# Patient Record
Sex: FEMALE | Race: WHITE | NOT HISPANIC OR LATINO | Employment: FULL TIME | URBAN - METROPOLITAN AREA
[De-identification: names, ages, dates, MRNs, and addresses within clinical notes are randomized per-mention and may not be internally consistent; named-entity substitution may affect disease eponyms.]

---

## 2020-08-12 RX ORDER — ESTROGENS, CONJUGATED 1.25 MG
1 TABLET ORAL DAILY
COMMUNITY
Start: 2020-07-11 | End: 2020-08-12 | Stop reason: SDUPTHER

## 2020-08-12 RX ORDER — ESTROGENS, CONJUGATED 1.25 MG
1.25 TABLET ORAL DAILY
Qty: 90 TABLET | Refills: 3 | Status: SHIPPED | OUTPATIENT
Start: 2020-08-12 | End: 2021-08-09

## 2020-08-24 ENCOUNTER — TELEPHONE (OUTPATIENT)
Dept: OBSTETRICS AND GYNECOLOGY | Facility: CLINIC | Age: 50
End: 2020-08-24

## 2020-08-24 ENCOUNTER — OFFICE VISIT (OUTPATIENT)
Dept: OBSTETRICS AND GYNECOLOGY | Facility: CLINIC | Age: 50
End: 2020-08-24
Payer: COMMERCIAL

## 2020-08-24 VITALS
HEIGHT: 64 IN | DIASTOLIC BLOOD PRESSURE: 72 MMHG | SYSTOLIC BLOOD PRESSURE: 110 MMHG | BODY MASS INDEX: 29.19 KG/M2 | WEIGHT: 171 LBS

## 2020-08-24 DIAGNOSIS — Z01.419 GYNECOLOGIC EXAM NORMAL: Primary | ICD-10-CM

## 2020-08-24 DIAGNOSIS — B37.31 YEAST INFECTION OF THE VAGINA: ICD-10-CM

## 2020-08-24 DIAGNOSIS — Z12.31 VISIT FOR SCREENING MAMMOGRAM: ICD-10-CM

## 2020-08-24 PROCEDURE — 99396 PR PREVENTIVE VISIT,EST,40-64: ICD-10-PCS | Mod: S$GLB,,, | Performed by: NURSE PRACTITIONER

## 2020-08-24 PROCEDURE — 99396 PREV VISIT EST AGE 40-64: CPT | Mod: S$GLB,,, | Performed by: NURSE PRACTITIONER

## 2020-08-24 PROCEDURE — 3008F PR BODY MASS INDEX (BMI) DOCUMENTED: ICD-10-PCS | Mod: CPTII,S$GLB,, | Performed by: NURSE PRACTITIONER

## 2020-08-24 PROCEDURE — 3008F BODY MASS INDEX DOCD: CPT | Mod: CPTII,S$GLB,, | Performed by: NURSE PRACTITIONER

## 2020-08-24 RX ORDER — FLUCONAZOLE 150 MG/1
150 TABLET ORAL EVERY OTHER DAY
Qty: 2 TABLET | Refills: 0 | Status: SHIPPED | OUTPATIENT
Start: 2020-08-24 | End: 2020-08-27

## 2020-08-24 RX ORDER — DUPILUMAB 300 MG/2ML
INJECTION, SOLUTION SUBCUTANEOUS
COMMUNITY
Start: 2020-08-21 | End: 2021-08-25

## 2020-08-24 RX ORDER — ESCITALOPRAM OXALATE 10 MG/1
TABLET ORAL
COMMUNITY
Start: 2020-08-13 | End: 2023-09-21

## 2020-08-24 RX ORDER — LORAZEPAM 0.5 MG/1
TABLET ORAL
COMMUNITY
Start: 2020-08-14 | End: 2021-08-25

## 2020-08-24 RX ORDER — CLOTRIMAZOLE AND BETAMETHASONE DIPROPIONATE 10; .64 MG/G; MG/G
CREAM TOPICAL 2 TIMES DAILY
Qty: 30 G | Refills: 0 | Status: SHIPPED | OUTPATIENT
Start: 2020-08-24 | End: 2020-08-31

## 2020-08-24 RX ORDER — SPIRONOLACTONE 50 MG/1
TABLET, FILM COATED ORAL
COMMUNITY
Start: 2020-08-13 | End: 2022-03-29

## 2020-08-24 NOTE — PROGRESS NOTES
"  Subjective:      Patient ID: Jessie Bush is a 50 y.o. female who presents for evaluation today.    Chief Complaint:    Well Woman (No pap today.) and Vaginitis      History of Present Illness  HPI  Annual Exam-Postmenopausal  Patient presents for annual exam. The patient has no complaints today. The patient is sexually active. GYN screening history: last pap: was normal. The patient is taking hormone replacement therapy. Patient denies post-menopausal vaginal bleeding. The patient wears seatbelts: yes. The patient participates in regular exercise: no. Has the patient ever been transfused or tattooed?: not asked. The patient reports that there is not domestic violence in her life.         GYN History  No LMP recorded. Patient has had a hysterectomy.   Date of Last Pap: Pap smear completed today Pap smear not performed    VITALS  BP Readings from Last 1 Encounters:   08/24/20 110/72   Weight: 77.6 kg (171 lb)   Height: 5' 4" (162.6 cm)   BMI Readings from Last 1 Encounters:   08/24/20 29.35 kg/m²       FAMILY HISTORY  Family History   Problem Relation Age of Onset    Colon cancer Maternal Grandfather        SOCIAL HISTORY  Social History     Tobacco Use   Smoking Status Never Smoker   ,   Social History     Substance and Sexual Activity   Alcohol Use Never    Frequency: Never        MEDICATIONS  Outpatient Medications Marked as Taking for the 8/24/20 encounter (Office Visit) with Aydee Dougherty NP   Medication Sig Dispense Refill    DUPIXENT 300 mg/2 mL Syrg       escitalopram oxalate (LEXAPRO) 10 MG tablet       LORazepam (ATIVAN) 0.5 MG tablet TK 1/2 TO 1 T PO D PRF AGITATION/ANXIETY      PREMARIN 1.25 mg tablet Take 1 tablet (1.25 mg total) by mouth once daily. 90 tablet 3    spironolactone (ALDACTONE) 50 MG tablet TK 1 T PO QAM         Review of Systems   Review of Systems   Constitutional: Negative for activity change, appetite change, chills, fatigue and fever.   HENT: Negative for nasal " congestion and tinnitus.    Eyes: Negative for visual disturbance.   Respiratory: Negative for cough and shortness of breath.    Cardiovascular: Negative for chest pain and palpitations.   Gastrointestinal: Negative for abdominal pain, bloating, blood in stool, constipation, nausea and vomiting.   Endocrine: Negative for diabetes, hair loss and hot flashes.   Genitourinary: Negative for bladder incontinence, decreased libido, dysmenorrhea, dyspareunia, dysuria, flank pain, frequency, genital sores, hematuria, hot flashes, menorrhagia, menstrual problem, pelvic pain, urgency, vaginal bleeding, vaginal discharge, vaginal pain, urinary incontinence, postcoital bleeding, postmenopausal bleeding, vaginal dryness and vaginal odor.        Vaginal itching and irritation, had an old diflucan that she took and had a little relief. Taking probiotics and yogurt   Musculoskeletal: Negative for arthralgias, back pain, leg pain and myalgias.   Integumentary:  Negative for rash, acne, hair changes, mole/lesion, breast mass, nipple discharge, breast skin changes and breast tenderness.   Neurological: Negative for vertigo, syncope, numbness and headaches.   Hematological: Does not bruise/bleed easily.   Psychiatric/Behavioral: Negative for depression and sleep disturbance. The patient is not nervous/anxious.    Breast: Negative for asymmetry, lump, mass, mastodynia, nipple discharge, skin changes and tenderness          Objective:     Physical Exam:   Constitutional: She appears well-developed and well-nourished.    HENT:   Nose: No epistaxis.     Neck: No thyroid mass and no thyromegaly present.     Pulmonary/Chest: Effort normal and breath sounds normal. Chest wall is not dull to percussion. She exhibits no mass, no tenderness, no bony tenderness, no laceration, no crepitus, no edema, no deformity, no swelling and no retraction. Right breast exhibits no inverted nipple, no mass, no nipple discharge, no skin change, no tenderness,  presence, no bleeding and no swelling. Left breast exhibits no inverted nipple, no mass, no nipple discharge, no skin change, no tenderness, presence, no bleeding and no swelling. Breasts are symmetrical.        Abdominal: Soft. Normal appearance and bowel sounds are normal. She exhibits no distension. There is no abdominal tenderness. Hernia confirmed negative in the right inguinal area and confirmed negative in the left inguinal area.     Genitourinary:    Rectum normal.            Pelvic exam was performed with patient supine.   There is no rash, tenderness, lesion or injury on the right labia. There is no rash, tenderness, lesion or injury on the left labia. Uterus is absent. Right adnexum displays no mass, no tenderness and no fullness. Left adnexum displays no mass, no tenderness and no fullness. No erythema, tenderness, bleeding, rectocele, cystocele or unspecified prolapse of vaginal walls in the vagina.    No foreign body in the vagina.      No signs of injury in the vagina.   Vaginal cuff normal.Labial bartholins normal.Cervix exhibits absence.    Genitourinary Comments: White DC noted    positive for vaginal discharge               Skin: Skin is warm and dry.    Psychiatric: She has a normal mood and affect. Her speech is normal and behavior is normal.          Assessment:        1. Gynecologic exam normal    2. Yeast infection of the vagina    3. Visit for screening mammogram         Plan:     Patient instructed to contact the clinic should any questions or concerns arise prior to her next office visit. Patient is happy with the plan of care at this time, verbalizes understanding and denies outstanding questions.            PROBLEM VISIT NOTES  Subjective:      Patient ID: Jessie Bush is a 50 y.o. y.o. female who presents for her wellness exam today but would also like further evaluation of possible yeast infection.    Chief Complaint:    Well Woman (No pap today.) and Vaginitis      History of  "Present Illness:  Vaginal Discharge - Patient presents for evaluation of an abnormal vaginal discharge. Symptoms have been present for 5 days. Vaginal symptoms: local irritation. To date she reports OTC treatment modalities for symptom relief. The patient denies any recent exposure to STD's. Contraception: hyst. She denies blisters, bumps, lesions, odor and tears. Sexually transmitted infection risk: very low risk of STD exposure.     VITALS  BP Readings from Last 1 Encounters:   08/24/20 110/72   Weight: 77.6 kg (171 lb)   Height: 5' 4" (162.6 cm)   BMI Readings from Last 1 Encounters:   08/24/20 29.35 kg/m²     No LMP recorded. Patient has had a hysterectomy.    ROS: See above    Physical Exam: See above    Assessment:   1. Gynecologic exam normal     2. Yeast infection of the vagina  fluconazole (DIFLUCAN) 150 MG Tab    clotrimazole-betamethasone 1-0.05% (LOTRISONE) cream   3. Visit for screening mammogram  Mammo Digital Screening Bilat w/ [facility]         Plan:      Gynecologic exam normal    Yeast infection of the vagina  -     fluconazole (DIFLUCAN) 150 MG Tab; Take 1 tablet (150 mg total) by mouth every other day. for 2 doses  Dispense: 2 tablet; Refill: 0  -     clotrimazole-betamethasone 1-0.05% (LOTRISONE) cream; Apply topically 2 (two) times daily. for 7 days  Dispense: 30 g; Refill: 0    Visit for screening mammogram  -     Mammo Digital Screening Bilat w/ [facility]; Future; Expected date: 08/24/2020      Keep the vaginal area clean and dry. Warm moist environments are a good breeding ground for yeast. Shower promptly after exercising. Wear cotton underwear and loose fitting clothes on the bottom. If you swim, try not to stay in a wet bathing suit too long. Decreasing carbs and sugar in the diet can also prevent yeast infections. Please refer to the education handout or call the office with concerns            "

## 2020-08-24 NOTE — TELEPHONE ENCOUNTER
Called patient to schedule appointment for evaluation. Pt states she can come in this afternoon and wanted to reschedule her annual appointment for today vs next week. Pt scheduled. Claritza Herrera

## 2020-08-24 NOTE — TELEPHONE ENCOUNTER
----- Message from Frida Hurtado sent at 8/24/2020  9:40 AM CDT -----  .Type:  Needs Medical Advice    Who Called: self  Symptoms (please be specific): discharging, itching  How long has patient had these symptoms:  4 days  Pharmacy name and phone #:  .  [id number] - [place], [place] - [address] & [address] [address]  Phone: [phone] Fax: [phone]      Would the patient rather a call back or a response via [facility]? call  Best Call Back Number: .[phone] (home)   Additional Information:

## 2020-08-24 NOTE — PATIENT INSTRUCTIONS
Breast Health: Breast Self-Awareness  What is breast self-awareness?  Breast self-awareness is knowing how your breasts normally look and feel. Your breasts change as you go through different stages of your life. So its important to learn what is normal for your breasts. Breast self-awareness helps you notice any changes in your breasts right away. Report any changes to your healthcare provider.  Why is breast self-awareness important?  Many experts now say that women should focus on breast self-awareness instead of doing a breast self-examination (BSE). These experts include the American Cancer Society, the U.S. Preventive Services Task Force, and the American Congress of Obstetricians and Gynecologists. Some experts even advise not teaching women to do a BSE. Thats because research hasnt shown a clear benefit to doing BSEs.  Breast self-awareness is different than a BSE. Breast self-awareness isnt about following a certain method and schedule. Its about knowing what's normal for your breasts. That way you can notice even small changes right away. If you see any changes, report them to your healthcare provider.  Changes to look for  Call your healthcare provider if you find any changes in your breasts that concern you. These changes may include:  · A lump  · Nipple discharge other than breast milk, especially a bloody discharge  · Swelling  · A change in size or shape  · Skin irritation, such as redness, thickening, or dimpling of the skin  · Swollen lymph nodes in the armpit  · Nipple problems, such as pain or redness  If you find a lump  Contact your provider if you find lumpiness in one breast, feel something different in the tissue, or feel a definite lump. Sometimes lumpiness may be due to menstrual changes. But there may be reason for concern.  Your provider may want to see you right away if you have:  · Nipple discharge that is bloody  · Skin changes on your breast, such as dimpling or puckering  Its  normal to be upset if you find a lump. But its important to contact your provider right away. Remember that most breast lumps are benign. This means they are not cancer.  Date Last Reviewed: 8/10/2015  © 0585-7510 [employer]. [address] [place] [address]. All rights reserved. This information is not intended as a substitute for professional medical care. Always follow your healthcare professional's instructions.        Preventing Vaginitis     Use mild, unscented soap when you bathe or shower to avoid irritating your vagina.    Vaginitis is irritation or infection of the vagina or vulva (the outside opening of the vagina). Vaginitis can be caused by bacteria, viruses, parasites, or yeast. Chemicals (such as in perfumes or soaps or in spermicides) can sometimes be a cause. Vaginitis can be caused by hormone changes in pregnancy or with menopause. You can help prevent vaginitis. Follow the tips below. And see your healthcare provider if you have any symptoms.  Hygiene  · Avoid chemicals. Do not use vaginal sprays. Do not use scented toilet paper or tampons that are scented. Sprays and scents have chemicals that can irritate your vagina.  · Do not douche unless you are told to by your healthcare provider. Douching is rarely needed. And it upsets the normal balance in the vagina.  · Wash yourself well. Wash the outer vaginal area (vulva) every day with mild, unscented soap. Keep it as dry as possible.  · Wipe correctly. Make sure to wipe from front to back after a bowel movement. This helps keep from spreading bacteria from your anus to your vagina.  · Change your tampon often. During your period, make sure to change your tampon as often as directed on the package. This allows the normal flow of vaginal discharge and blood.  Lifestyle  · Limit your number of sexual partners. The more partners you have, the greater your risk of infection. Using condoms helps reduce your risk.  · Get enough  sleep. Sleep helps keep your bodys immune system healthy. This helps you fight infection.  · Lose weight, if needed. Excess weight can reduce air circulation around your vagina. This can increase your risk of infection.  · Exercise regularly. Regular activity helps keep your body healthy.  · Take antibiotics only as directed. Antibiotics can change the normal chemical balance in the vagina.    Clothing  · Dont sit in wet clothes. Yeast thrives when its warm and damp.  · Dont wear tight pants. And dont wear tights, leggings, or hose without a cotton crotch. These types of clothing trap warmth and moisture.  · Wear cotton underwear. Cotton lets air circulate around the vagina.  Symptoms of vaginitis  · Irritation, swelling, or itching of the genital area  · Vaginal discharge  · Bad vaginal odor  · Pain or burning during urination   Date Last Reviewed: 12/1/2016  © 1136-3371 [employer] [address] [place] [address], [place] [address]. All rights reserved. This information is not intended as a substitute for professional medical care. Always follow your healthcare professional's instructions.

## 2020-08-24 NOTE — TELEPHONE ENCOUNTER
----- Message from Frida Hurtado sent at 8/24/2020  9:40 AM CDT -----  .Type:  Needs Medical Advice    Who Called: self  Symptoms (please be specific): discharging, itching  How long has patient had these symptoms:  4 days  Pharmacy name and phone #:  .  [facility] #[id number] - [place], [place] - [address] [address]  Phone: [phone] Fax: [phone]      Would the patient rather a call back or a response via [facility]? call  Best Call Back Number: .[phone] (home)   Additional Information:

## 2021-08-25 ENCOUNTER — OFFICE VISIT (OUTPATIENT)
Dept: OBSTETRICS AND GYNECOLOGY | Facility: CLINIC | Age: 51
End: 2021-08-25
Payer: COMMERCIAL

## 2021-08-25 VITALS
SYSTOLIC BLOOD PRESSURE: 106 MMHG | HEIGHT: 64 IN | HEART RATE: 132 BPM | BODY MASS INDEX: 31.41 KG/M2 | WEIGHT: 184 LBS | DIASTOLIC BLOOD PRESSURE: 70 MMHG

## 2021-08-25 DIAGNOSIS — Z01.419 GYNECOLOGIC EXAM NORMAL: Primary | ICD-10-CM

## 2021-08-25 DIAGNOSIS — R68.82 DECREASED LIBIDO: ICD-10-CM

## 2021-08-25 DIAGNOSIS — Z13.820 SCREENING FOR OSTEOPOROSIS: ICD-10-CM

## 2021-08-25 DIAGNOSIS — R61 NIGHT SWEATS: ICD-10-CM

## 2021-08-25 DIAGNOSIS — Z12.11 COLON CANCER SCREENING: ICD-10-CM

## 2021-08-25 DIAGNOSIS — Z12.31 ENCOUNTER FOR SCREENING MAMMOGRAM FOR MALIGNANT NEOPLASM OF BREAST: ICD-10-CM

## 2021-08-25 DIAGNOSIS — R63.5 WEIGHT GAIN: ICD-10-CM

## 2021-08-25 PROCEDURE — 99396 PREV VISIT EST AGE 40-64: CPT | Mod: S$GLB,,, | Performed by: NURSE PRACTITIONER

## 2021-08-25 PROCEDURE — 3074F SYST BP LT 130 MM HG: CPT | Mod: CPTII,S$GLB,, | Performed by: NURSE PRACTITIONER

## 2021-08-25 PROCEDURE — 3074F PR MOST RECENT SYSTOLIC BLOOD PRESSURE < 130 MM HG: ICD-10-PCS | Mod: CPTII,S$GLB,, | Performed by: NURSE PRACTITIONER

## 2021-08-25 PROCEDURE — 1160F PR REVIEW ALL MEDS BY PRESCRIBER/CLIN PHARMACIST DOCUMENTED: ICD-10-PCS | Mod: CPTII,S$GLB,, | Performed by: NURSE PRACTITIONER

## 2021-08-25 PROCEDURE — 99396 PR PREVENTIVE VISIT,EST,40-64: ICD-10-PCS | Mod: S$GLB,,, | Performed by: NURSE PRACTITIONER

## 2021-08-25 PROCEDURE — 1159F MED LIST DOCD IN RCRD: CPT | Mod: CPTII,S$GLB,, | Performed by: NURSE PRACTITIONER

## 2021-08-25 PROCEDURE — 1159F PR MEDICATION LIST DOCUMENTED IN MEDICAL RECORD: ICD-10-PCS | Mod: CPTII,S$GLB,, | Performed by: NURSE PRACTITIONER

## 2021-08-25 PROCEDURE — 3008F BODY MASS INDEX DOCD: CPT | Mod: CPTII,S$GLB,, | Performed by: NURSE PRACTITIONER

## 2021-08-25 PROCEDURE — 3008F PR BODY MASS INDEX (BMI) DOCUMENTED: ICD-10-PCS | Mod: CPTII,S$GLB,, | Performed by: NURSE PRACTITIONER

## 2021-08-25 PROCEDURE — 1160F RVW MEDS BY RX/DR IN RCRD: CPT | Mod: CPTII,S$GLB,, | Performed by: NURSE PRACTITIONER

## 2021-08-25 PROCEDURE — 3078F PR MOST RECENT DIASTOLIC BLOOD PRESSURE < 80 MM HG: ICD-10-PCS | Mod: CPTII,S$GLB,, | Performed by: NURSE PRACTITIONER

## 2021-08-25 PROCEDURE — 3078F DIAST BP <80 MM HG: CPT | Mod: CPTII,S$GLB,, | Performed by: NURSE PRACTITIONER

## 2021-08-25 RX ORDER — TESTOSTERONE CYPIONATE 200 MG/ML
200 INJECTION, SOLUTION INTRAMUSCULAR
Qty: 1 ML | Refills: 5 | Status: SHIPPED | OUTPATIENT
Start: 2021-08-25 | End: 2022-03-07

## 2021-08-25 RX ORDER — BUDESONIDE AND FORMOTEROL FUMARATE DIHYDRATE 160; 4.5 UG/1; UG/1
2 AEROSOL RESPIRATORY (INHALATION) 2 TIMES DAILY
COMMUNITY
Start: 2021-08-10 | End: 2022-09-15

## 2021-08-25 RX ORDER — ESTRADIOL VALERATE 40 MG/ML
40 INJECTION INTRAMUSCULAR
Qty: 1 ML | Refills: 12 | Status: SHIPPED | OUTPATIENT
Start: 2021-08-25 | End: 2023-03-02 | Stop reason: SDUPTHER

## 2021-09-01 ENCOUNTER — TELEPHONE (OUTPATIENT)
Dept: OBSTETRICS AND GYNECOLOGY | Facility: CLINIC | Age: 51
End: 2021-09-01

## 2021-09-08 ENCOUNTER — PATIENT MESSAGE (OUTPATIENT)
Dept: OBSTETRICS AND GYNECOLOGY | Facility: CLINIC | Age: 51
End: 2021-09-08

## 2021-09-08 ENCOUNTER — TELEPHONE (OUTPATIENT)
Dept: OBSTETRICS AND GYNECOLOGY | Facility: CLINIC | Age: 51
End: 2021-09-08

## 2021-09-08 DIAGNOSIS — Z00.00 ENCOUNTER FOR WELLNESS EXAMINATION: Primary | ICD-10-CM

## 2021-09-21 ENCOUNTER — PATIENT MESSAGE (OUTPATIENT)
Dept: OBSTETRICS AND GYNECOLOGY | Facility: CLINIC | Age: 51
End: 2021-09-21

## 2021-09-30 ENCOUNTER — PATIENT MESSAGE (OUTPATIENT)
Dept: OBSTETRICS AND GYNECOLOGY | Facility: CLINIC | Age: 51
End: 2021-09-30

## 2021-09-30 ENCOUNTER — CLINICAL SUPPORT (OUTPATIENT)
Dept: OBSTETRICS AND GYNECOLOGY | Facility: CLINIC | Age: 51
End: 2021-09-30
Payer: COMMERCIAL

## 2021-09-30 DIAGNOSIS — Z78.0 MENOPAUSE: Primary | ICD-10-CM

## 2021-09-30 PROCEDURE — 96372 THER/PROPH/DIAG INJ SC/IM: CPT | Mod: S$GLB,,, | Performed by: NURSE PRACTITIONER

## 2021-09-30 PROCEDURE — 96372 PR INJECTION,THERAP/PROPH/DIAG2ST, IM OR SUBCUT: ICD-10-PCS | Mod: S$GLB,,, | Performed by: NURSE PRACTITIONER

## 2021-09-30 RX ORDER — ESTRADIOL VALERATE 20 MG/ML
40 INJECTION INTRAMUSCULAR
Status: COMPLETED | OUTPATIENT
Start: 2021-09-30 | End: 2021-09-30

## 2021-09-30 RX ADMIN — ESTRADIOL VALERATE 40 MG: 20 INJECTION INTRAMUSCULAR at 03:09

## 2021-10-15 ENCOUNTER — PATIENT MESSAGE (OUTPATIENT)
Dept: OBSTETRICS AND GYNECOLOGY | Facility: CLINIC | Age: 51
End: 2021-10-15
Payer: COMMERCIAL

## 2022-02-01 ENCOUNTER — TELEPHONE (OUTPATIENT)
Dept: PLASTIC SURGERY | Facility: CLINIC | Age: 52
End: 2022-02-01
Payer: COMMERCIAL

## 2022-02-01 NOTE — TELEPHONE ENCOUNTER
----- Message from Alex Alaniz sent at 2/1/2022  4:18 PM CST -----  Regarding: Sooner appt  Contact: [facility]  Appointment Request From: Jessie Bush    With Provider: Daljit    Preferred Date Range: 2/1/2022 - 2/28/2022    Preferred Times: Any Time    Reason for visit: Abdominoplasty    Comments:  Abdominoplasty        .Type:  Sooner Apoointment Request    Pt is requesting a sooner appointment.       When is the first available appointment?3/10/2022    Would the patient rather a call back or a response via [facility]? [facility] [place]

## 2022-02-25 ENCOUNTER — PATIENT MESSAGE (OUTPATIENT)
Dept: OBSTETRICS AND GYNECOLOGY | Facility: CLINIC | Age: 52
End: 2022-02-25
Payer: COMMERCIAL

## 2022-03-07 RX ORDER — TESTOSTERONE CYPIONATE 200 MG/ML
200 INJECTION, SOLUTION INTRAMUSCULAR
Qty: 1 ML | Refills: 1 | Status: SHIPPED | OUTPATIENT
Start: 2022-03-07 | End: 2022-07-25

## 2022-03-10 ENCOUNTER — OFFICE VISIT (OUTPATIENT)
Dept: PLASTIC SURGERY | Facility: CLINIC | Age: 52
End: 2022-03-10
Payer: COMMERCIAL

## 2022-03-10 ENCOUNTER — PATIENT MESSAGE (OUTPATIENT)
Dept: OBSTETRICS AND GYNECOLOGY | Facility: CLINIC | Age: 52
End: 2022-03-10
Payer: COMMERCIAL

## 2022-03-10 VITALS
BODY MASS INDEX: 35 KG/M2 | HEIGHT: 64 IN | WEIGHT: 205 LBS | DIASTOLIC BLOOD PRESSURE: 75 MMHG | SYSTOLIC BLOOD PRESSURE: 116 MMHG | HEART RATE: 89 BPM | OXYGEN SATURATION: 97 %

## 2022-03-10 DIAGNOSIS — E88.1 LIPODYSTROPHY: Primary | ICD-10-CM

## 2022-03-10 PROCEDURE — 1159F PR MEDICATION LIST DOCUMENTED IN MEDICAL RECORD: ICD-10-PCS | Mod: CPTII,S$GLB,, | Performed by: SURGERY

## 2022-03-10 PROCEDURE — 3074F SYST BP LT 130 MM HG: CPT | Mod: CPTII,S$GLB,, | Performed by: SURGERY

## 2022-03-10 PROCEDURE — 3008F PR BODY MASS INDEX (BMI) DOCUMENTED: ICD-10-PCS | Mod: CPTII,S$GLB,, | Performed by: SURGERY

## 2022-03-10 PROCEDURE — 3074F PR MOST RECENT SYSTOLIC BLOOD PRESSURE < 130 MM HG: ICD-10-PCS | Mod: CPTII,S$GLB,, | Performed by: SURGERY

## 2022-03-10 PROCEDURE — 3078F DIAST BP <80 MM HG: CPT | Mod: CPTII,S$GLB,, | Performed by: SURGERY

## 2022-03-10 PROCEDURE — 99499 UNLISTED E&M SERVICE: CPT | Mod: S$GLB,,, | Performed by: SURGERY

## 2022-03-10 PROCEDURE — 1159F MED LIST DOCD IN RCRD: CPT | Mod: CPTII,S$GLB,, | Performed by: SURGERY

## 2022-03-10 PROCEDURE — 3008F BODY MASS INDEX DOCD: CPT | Mod: CPTII,S$GLB,, | Performed by: SURGERY

## 2022-03-10 PROCEDURE — 3078F PR MOST RECENT DIASTOLIC BLOOD PRESSURE < 80 MM HG: ICD-10-PCS | Mod: CPTII,S$GLB,, | Performed by: SURGERY

## 2022-03-10 PROCEDURE — 99499 NO LOS: ICD-10-PCS | Mod: S$GLB,,, | Performed by: SURGERY

## 2022-03-10 NOTE — PROGRESS NOTES
CONSULTATION NOTE  ?  CC  Chief Complaint   Patient presents with    Surgical Consult     Cosmetic surgery     ?  Referring Provider  Self  ?  HPI  Jessie Bush is a 51 y.o. yo female who is here for evaluation for cosmetic surgery.     Weight has been stable 6 mo  Body mass index is Body mass index is 35.19 kg/m².    She is . No plans for future pregnancy.      Bra Size 40C, desires C cup desires to have them more symmetrical     Smoking history: never      No bleeding or clotting issues.     Prior surgery to abdomen or breast: bilateral breast reduction, laparoscopy, abdominal hysterectomy    No personal or family history of breast cancer in first degree relative.     Accutane use never  Occupation        PMH  Past Medical History:   Diagnosis Date    Asthma     Endometriosis     Ovarian cyst          PSH  Past Surgical History:   Procedure Laterality Date    DIAGNOSTIC LAPAROSCOPY      LAPAROSCOPIC SLEEVE GASTRECTOMY      LOOP ELECTROSURGICAL EXCISION PROCEDURE (LEEP)      REDUCTION OF BOTH BREASTS      TONSILLECTOMY      TOTAL ABDOMINAL HYSTERECTOMY W/ BILATERAL SALPINGOOPHORECTOMY           FHX  Family History   Problem Relation Age of Onset    No Known Problems Mother     No Known Problems Father     Colon cancer Maternal Grandfather     Breast cancer Neg Hx     Ovarian cancer Neg Hx     Uterine cancer Neg Hx     Melanoma Neg Hx     Pancreatic cancer Neg Hx     Prostate cancer Neg Hx           MEDICATIONS  Current Outpatient Medications   Medication Instructions    escitalopram oxalate (LEXAPRO) 10 MG tablet No dose, route, or frequency recorded.    estradiol valerate (DELESTROGEN) 40 mg, Intramuscular, Every 28 days    spironolactone (ALDACTONE) 50 MG tablet TK 1 T PO QAM    SYMBICORT 160-4.5 mcg/actuation HFAA 2 puffs, Inhalation, 2 times daily    testosterone cypionate (DEPOTESTOTERONE CYPIONATE) 200 mg, Intramuscular, Every 12 weeks         ALLERGIES  "  Review of patient's allergies indicates:  No Known Allergies      Social History  Social History     Socioeconomic History    Marital status:    Tobacco Use    Smoking status: Never Smoker    Smokeless tobacco: Never Used   Substance and Sexual Activity    Alcohol use: Never    Drug use: Never    Sexual activity: Yes     Partners: Male     Birth control/protection: See Surgical Hx     Comment: Hysterectomy           ROS  Review of Systems   Constitutional: Negative for chills and fever.   HENT: Negative for congestion.    Eyes: Negative for blurred vision and double vision.   Respiratory: Negative for cough and shortness of breath.    Cardiovascular: Negative for chest pain and palpitations.   Gastrointestinal: Negative for abdominal pain, nausea and vomiting.   Genitourinary: Negative for dysuria and frequency.   Musculoskeletal: Negative for back pain and neck pain.   Skin: Negative for itching and rash.   Neurological: Negative for dizziness, seizures and headaches.   Endo/Heme/Allergies: Does not bruise/bleed easily.   Psychiatric/Behavioral: Negative for depression and substance abuse.          Physical Exam  /75   Pulse 89   Ht 5' 4" (1.626 m)   Wt 93 kg (205 lb)   SpO2 97%   BMI 35.19 kg/m²     Constitutional:       General: She is not in acute distress.     Appearance: She is not diaphoretic.   HENT:      Head: Normocephalic and atraumatic.   Eyes:      General: No scleral icterus.     Conjunctiva/sclera: Conjunctivae normal.   Cardiovascular:      Rate and Rhythm: Normal rate.   Pulmonary:      Effort: Pulmonary effort is normal. No respiratory distress.      Breath sounds: No stridor.   Abdominal:      General: There is no distension.      Palpations: Abdomen is soft.      Tenderness: There is no abdominal tenderness.   Musculoskeletal:         General: No tenderness. Normal range of motion.      Cervical back: Normal range of motion.   Skin:     General: Skin is warm.      " Findings: No erythema or rash.   Neurological:      Mental Status: She is alert and oriented to person, place, and time.   Psychiatric:         Mood and Affect: Affect normal.         Judgment: Judgment normal.     Grade 2 ptosis nac widened, no mass no discharge bilateral  [place] scars well healed  Abdominal and lumbar lipodystrophy  Rectus diastasis  Skin pinch 3cm    No hernias appreciated  Mons pubis with some descent        IMPRESSION:  Abdominal lipodystrophy skin excess and diastasis      We discussed   1)  lipoabdominoplasty, exparel injection 4.5H   a)Then a second stage of liposuction of the upper abdomen/flanks(1H lipo fee) once recovered from first surgery given abd wall thickness T-1.5h    2) revision Mastopexy, exparel injection-3H     3).LipoAbdominoplasty, liposuction flank, lower back (supine), abdomen, chest wall, revision mastopexy-5.5H  -Then a second stage of liposuction of the upper abdomen/flanks(1Hlipofee) once recovered from first surgery T-1.5H    Need to find out what kind of breast reduction previously had with [facility] 20years ago    Obtain prior mammogram reports    She has a moderate amount of intra-abdominal fat which we discussed cannot be surgically modified by me and weight loss is the way to address this. We discussed postoperative expectations given these findings.

## 2022-03-11 PROBLEM — E88.1 LIPODYSTROPHY: Status: ACTIVE | Noted: 2022-03-11

## 2022-03-25 ENCOUNTER — PATIENT MESSAGE (OUTPATIENT)
Dept: OBSTETRICS AND GYNECOLOGY | Facility: CLINIC | Age: 52
End: 2022-03-25
Payer: COMMERCIAL

## 2022-03-29 ENCOUNTER — OFFICE VISIT (OUTPATIENT)
Dept: OBSTETRICS AND GYNECOLOGY | Facility: CLINIC | Age: 52
End: 2022-03-29
Payer: COMMERCIAL

## 2022-03-29 VITALS — WEIGHT: 205 LBS | BODY MASS INDEX: 35 KG/M2 | HEIGHT: 64 IN

## 2022-03-29 DIAGNOSIS — R35.0 FREQUENCY OF URINATION: Primary | ICD-10-CM

## 2022-03-29 DIAGNOSIS — Z98.890 STATUS POST COSMETIC PLASTIC SURGERY: Primary | ICD-10-CM

## 2022-03-29 DIAGNOSIS — M54.50 ACUTE BILATERAL LOW BACK PAIN WITHOUT SCIATICA: ICD-10-CM

## 2022-03-29 PROCEDURE — 3008F BODY MASS INDEX DOCD: CPT | Mod: CPTII,S$GLB,, | Performed by: NURSE PRACTITIONER

## 2022-03-29 PROCEDURE — 99214 PR OFFICE/OUTPT VISIT, EST, LEVL IV, 30-39 MIN: ICD-10-PCS | Mod: S$GLB,,, | Performed by: NURSE PRACTITIONER

## 2022-03-29 PROCEDURE — 1160F RVW MEDS BY RX/DR IN RCRD: CPT | Mod: CPTII,S$GLB,, | Performed by: NURSE PRACTITIONER

## 2022-03-29 PROCEDURE — 99214 OFFICE O/P EST MOD 30 MIN: CPT | Mod: S$GLB,,, | Performed by: NURSE PRACTITIONER

## 2022-03-29 PROCEDURE — 3008F PR BODY MASS INDEX (BMI) DOCUMENTED: ICD-10-PCS | Mod: CPTII,S$GLB,, | Performed by: NURSE PRACTITIONER

## 2022-03-29 PROCEDURE — 1160F PR REVIEW ALL MEDS BY PRESCRIBER/CLIN PHARMACIST DOCUMENTED: ICD-10-PCS | Mod: CPTII,S$GLB,, | Performed by: NURSE PRACTITIONER

## 2022-03-29 PROCEDURE — 1159F MED LIST DOCD IN RCRD: CPT | Mod: CPTII,S$GLB,, | Performed by: NURSE PRACTITIONER

## 2022-03-29 PROCEDURE — 1159F PR MEDICATION LIST DOCUMENTED IN MEDICAL RECORD: ICD-10-PCS | Mod: CPTII,S$GLB,, | Performed by: NURSE PRACTITIONER

## 2022-03-29 NOTE — PROGRESS NOTES
"  Subjective:       Patient ID: Jessie Bush is a 51 y.o. female.    Chief Complaint:  Urinary Tract Infection      History of Present Illness  HPI  urinary frequency x 2 days with lower back pain. Reports that she will void and then feel like she has to go again in 5 minutes. She reports that she had an URI last week and was on ABX and steroids and started wit ha yeast infection. She reports that those s/s have mostly resolved.     She has been experiencing some slight urinary leaking with sneezing    Outpatient Medications Marked as Taking for the 3/29/22 encounter (Office Visit) with Aydee Dougherty NP   Medication Sig Dispense Refill    benralizumab 30 mg/mL AtIn Inject 30 mg into the skin.      escitalopram oxalate (LEXAPRO) 10 MG tablet       estradiol valerate (DELESTROGEN) 40 mg/mL injection Inject 1 mL (40 mg total) into the muscle every 28 days. 1 mL 12    SYMBICORT 160-4.5 mcg/actuation HFAA Inhale 2 puffs into the lungs 2 (two) times daily.      testosterone cypionate (DEPOTESTOTERONE CYPIONATE) 200 mg/mL injection Inject 1 mL (200 mg total) into the muscle every 12 weeks. 1 mL 1     Vitals:    22 1334   Weight: 93 kg (205 lb)   Height: 5' 4" (1.626 m)     Past Medical History:   Diagnosis Date    Asthma     Endometriosis     Ovarian cyst      Past Surgical History:   Procedure Laterality Date    DIAGNOSTIC LAPAROSCOPY      LAPAROSCOPIC SLEEVE GASTRECTOMY      LOOP ELECTROSURGICAL EXCISION PROCEDURE (LEEP)      REDUCTION OF BOTH BREASTS      TONSILLECTOMY      TOTAL ABDOMINAL HYSTERECTOMY W/ BILATERAL SALPINGOOPHORECTOMY           GYN & OB History  No LMP recorded. Patient has had a hysterectomy.   Date of Last Pap: No result found    OB History    Para Term  AB Living   2 2 2     2   SAB IAB Ectopic Multiple Live Births                  # Outcome Date GA Lbr Onesimo/2nd Weight Sex Delivery Anes PTL Lv   2 Term      Vag-Spont      1 Term      Vag-Spont    "       Review of Systems  Review of Systems   Constitutional: Negative for activity change, appetite change, chills, fatigue and fever.   HENT: Negative for nasal congestion and tinnitus.    Eyes: Negative for visual disturbance.   Respiratory: Negative for cough and shortness of breath.    Cardiovascular: Negative for chest pain and palpitations.   Gastrointestinal: Negative for abdominal pain, bloating, blood in stool, constipation, nausea and vomiting.   Endocrine: Negative for diabetes, hair loss and hot flashes.   Genitourinary: Positive for frequency. Negative for bladder incontinence, decreased libido, dysmenorrhea, dyspareunia, dysuria, flank pain, genital sores, hematuria, hot flashes, menorrhagia, menstrual problem, pelvic pain, urgency, vaginal bleeding, vaginal discharge, vaginal pain, urinary incontinence, postcoital bleeding, postmenopausal bleeding, vaginal dryness and vaginal odor.   Musculoskeletal: Positive for back pain. Negative for arthralgias, leg pain and myalgias.   Integumentary:  Negative for rash, acne, hair changes, mole/lesion, breast mass, nipple discharge, breast skin changes and breast tenderness.   Neurological: Negative for vertigo, syncope, numbness and headaches.   Hematological: Does not bruise/bleed easily.   Psychiatric/Behavioral: Negative for depression and sleep disturbance. The patient is not nervous/anxious.    Breast: Negative for asymmetry, lump, mass, mastodynia, nipple discharge, skin changes and tenderness          Objective:    Physical Exam:   Constitutional: Vital signs are normal. She appears well-developed and well-nourished.    HENT:   Nose: No epistaxis.              Genitourinary:    Vagina normal.      Pelvic exam was performed with patient supine.   Labial bartholins normal.Right adnexum displays no mass, no tenderness and no fullness. Left adnexum displays no mass, no tenderness and no fullness. No erythema,  no vaginal discharge, tenderness, bleeding,  rectocele, cystocele or unspecified prolapse of vaginal walls in the vagina.    No foreign body in the vagina.      No signs of injury in the vagina.   Cervix is absent.Uterus is absent.                        Assessment:        1. Frequency of urination    2. Acute bilateral low back pain without sciatica                Plan:      Frequency of urination  -     Urine culture; Future; Expected date: 03/29/2022    Acute bilateral low back pain without sciatica  -     Urine culture; Future; Expected date: 03/29/2022      We will get cx and treat if needed. UA in office today was unremarkable.    Patient instructed to:    Increase oral fluid intake   Avoid consumption of bladder irritants such as, alcohol, carbonated beverages, etc.    Void immediately after intercourse if sexually active    Empty bladder at regular and frequent intervals    Patient education provided on the following topics:   Maintaining proper perineal hygiene   Taking showers versus baths   Avoiding heavily perfumed soaps and using a milder soap, such as Dove or Dial   Wearing loose non-restrictive clothing   Wearing cotton underwear    Instructed to call or return to clinic as needed if symptoms worsen or fail to improve as anticipated.     FU with PCP for concerns

## 2022-04-11 ENCOUNTER — TELEPHONE (OUTPATIENT)
Dept: PLASTIC SURGERY | Facility: CLINIC | Age: 52
End: 2022-04-11
Payer: COMMERCIAL

## 2022-04-11 NOTE — TELEPHONE ENCOUNTER
----- Message from Frida Hurtado sent at 4/11/2022  9:24 AM CDT -----  pt needs to know of other surgery dates, would like to make earlier. also hasn't heard anything regarding post operative garments. how soon and where can se pay remaining balance?...[phone] (home)

## 2022-04-11 NOTE — TELEPHONE ENCOUNTER
Spoke w/pt and let her know that her sx date of 5/31 is the soonest avail. Pt's preop appt was also r/s per pt request due to her being out of town on 4/18. Mary will contact pt regarding payment.

## 2022-05-11 DIAGNOSIS — Z41.1 ENCOUNTER FOR COSMETIC PROCEDURE: ICD-10-CM

## 2022-05-11 DIAGNOSIS — R63.4 WEIGHT LOSS: ICD-10-CM

## 2022-05-11 DIAGNOSIS — Z98.84 HISTORY OF WEIGHT LOSS SURGERY: ICD-10-CM

## 2022-05-11 DIAGNOSIS — R63.4 EXCESSIVE BODY WEIGHT LOSS: Primary | ICD-10-CM

## 2022-05-16 ENCOUNTER — OFFICE VISIT (OUTPATIENT)
Dept: PLASTIC SURGERY | Facility: CLINIC | Age: 52
End: 2022-05-16
Payer: COMMERCIAL

## 2022-05-16 VITALS
HEIGHT: 64 IN | DIASTOLIC BLOOD PRESSURE: 68 MMHG | HEART RATE: 83 BPM | SYSTOLIC BLOOD PRESSURE: 120 MMHG | OXYGEN SATURATION: 98 % | WEIGHT: 207 LBS | BODY MASS INDEX: 35.34 KG/M2

## 2022-05-16 DIAGNOSIS — Z41.1 ENCOUNTER FOR COSMETIC PROCEDURE: Primary | ICD-10-CM

## 2022-05-16 PROCEDURE — 3008F BODY MASS INDEX DOCD: CPT | Mod: CPTII,S$GLB,, | Performed by: SURGERY

## 2022-05-16 PROCEDURE — 3078F PR MOST RECENT DIASTOLIC BLOOD PRESSURE < 80 MM HG: ICD-10-PCS | Mod: CPTII,S$GLB,, | Performed by: SURGERY

## 2022-05-16 PROCEDURE — 3008F PR BODY MASS INDEX (BMI) DOCUMENTED: ICD-10-PCS | Mod: CPTII,S$GLB,, | Performed by: SURGERY

## 2022-05-16 PROCEDURE — 3074F SYST BP LT 130 MM HG: CPT | Mod: CPTII,S$GLB,, | Performed by: SURGERY

## 2022-05-16 PROCEDURE — 99499 UNLISTED E&M SERVICE: CPT | Mod: S$GLB,,, | Performed by: SURGERY

## 2022-05-16 PROCEDURE — 3074F PR MOST RECENT SYSTOLIC BLOOD PRESSURE < 130 MM HG: ICD-10-PCS | Mod: CPTII,S$GLB,, | Performed by: SURGERY

## 2022-05-16 PROCEDURE — 99499 NO LOS: ICD-10-PCS | Mod: S$GLB,,, | Performed by: SURGERY

## 2022-05-16 PROCEDURE — 3078F DIAST BP <80 MM HG: CPT | Mod: CPTII,S$GLB,, | Performed by: SURGERY

## 2022-05-16 RX ORDER — CEPHALEXIN 500 MG/1
500 CAPSULE ORAL EVERY 6 HOURS
Qty: 28 CAPSULE | Refills: 0 | Status: SHIPPED | OUTPATIENT
Start: 2022-05-16 | End: 2022-05-23

## 2022-05-16 RX ORDER — CYCLOBENZAPRINE HCL 10 MG
10 TABLET ORAL 3 TIMES DAILY
Qty: 15 TABLET | Refills: 0 | Status: SHIPPED | OUTPATIENT
Start: 2022-05-16 | End: 2022-05-21

## 2022-05-16 RX ORDER — OXYCODONE AND ACETAMINOPHEN 5; 325 MG/1; MG/1
TABLET ORAL
Qty: 20 TABLET | Refills: 0 | Status: SHIPPED | OUTPATIENT
Start: 2022-05-16 | End: 2022-08-11

## 2022-05-16 RX ORDER — ONDANSETRON 4 MG/1
4 TABLET, FILM COATED ORAL EVERY 6 HOURS PRN
Qty: 20 TABLET | Refills: 0 | Status: SHIPPED | OUTPATIENT
Start: 2022-05-16 | End: 2022-05-21

## 2022-05-16 RX ORDER — KETOROLAC TROMETHAMINE 10 MG/1
10 TABLET, FILM COATED ORAL EVERY 6 HOURS
Qty: 20 TABLET | Refills: 0 | Status: SHIPPED | OUTPATIENT
Start: 2022-05-16 | End: 2022-05-16 | Stop reason: CLARIF

## 2022-05-16 NOTE — PROGRESS NOTES
?  CC  Chief Complaint   Patient presents with    Surgical Consult     Cosmetic surgery     ?  Referring Provider  Self  ?  HPI  Jessie Bush is a 52 y.o. yo female who is here for evaluation for cosmetic surgery. Pt interested in revision breast reduction and lipoabdominoplasty    Weight has been stable 6 mo  Body mass index is Body mass index is 35.53 kg/m².    She is . No plans for future pregnancy.      Bra Size 40C, desires C cup desires to have them more symmetrical     Smoking history: never      No bleeding or clotting issues.     Prior surgery to abdomen or breast: bilateral breast reduction, laparoscopy, abdominal hysterectomy    No personal or family history of breast cancer in first degree relative.     Accutane use never  Occupation    Preoperative exam for bilateral revision breast reduction and lipoabdominoplasty scheduled for 22. Estradiol injection at the end of April will hold next dose prior to surgery. She did receive her SCDs at home. Consents done, pre and post operative instructions given to patient.     PMH  Past Medical History:   Diagnosis Date    Asthma     Endometriosis     Ovarian cyst          PSH  Past Surgical History:   Procedure Laterality Date    DIAGNOSTIC LAPAROSCOPY      LAPAROSCOPIC SLEEVE GASTRECTOMY      LOOP ELECTROSURGICAL EXCISION PROCEDURE (LEEP)      REDUCTION OF BOTH BREASTS      TONSILLECTOMY      TOTAL ABDOMINAL HYSTERECTOMY W/ BILATERAL SALPINGOOPHORECTOMY           FHX  Family History   Problem Relation Age of Onset    No Known Problems Mother     No Known Problems Father     Colon cancer Maternal Grandfather     Breast cancer Neg Hx     Ovarian cancer Neg Hx     Uterine cancer Neg Hx     Melanoma Neg Hx     Pancreatic cancer Neg Hx     Prostate cancer Neg Hx           MEDICATIONS  Current Outpatient Medications   Medication Instructions    benralizumab 30 mg, Subcutaneous, Every 2 months, used for asthma  "   cephALEXin (KEFLEX) 500 mg, Oral, Every 6 hours    cyclobenzaprine (FLEXERIL) 10 mg, Oral, 3 times daily    escitalopram oxalate (LEXAPRO) 10 MG tablet No dose, route, or frequency recorded.    estradiol valerate (DELESTROGEN) 40 mg, Intramuscular, Every 28 days    ondansetron (ZOFRAN) 4 mg, Oral, Every 6 hours PRN    oxyCODONE-acetaminophen (PERCOCET) 5-325 mg per tablet Take 1-2 tablets PO [id number] PRN pain    SYMBICORT 160-4.5 mcg/actuation HFAA 2 puffs, Inhalation, 2 times daily    testosterone cypionate (DEPOTESTOTERONE CYPIONATE) 200 mg, Intramuscular, Every 12 weeks         ALLERGIES   Review of patient's allergies indicates:  No Known Allergies      Social History  Social History     Socioeconomic History    Marital status:    Tobacco Use    Smoking status: Never Smoker    Smokeless tobacco: Never Used   Substance and Sexual Activity    Alcohol use: Never    Drug use: Never    Sexual activity: Yes     Partners: Male     Birth control/protection: See Surgical Hx     Comment: Hysterectomy           ROS  Review of Systems   Constitutional: Negative for chills and fever.   HENT: Negative for congestion.    Eyes: Negative for blurred vision and double vision.   Respiratory: Negative for cough and shortness of breath.    Cardiovascular: Negative for chest pain and palpitations.   Gastrointestinal: Negative for abdominal pain, nausea and vomiting.   Genitourinary: Negative for dysuria and frequency.   Musculoskeletal: Negative for back pain and neck pain.   Skin: Negative for itching and rash.   Neurological: Negative for dizziness, seizures and headaches.   Endo/Heme/Allergies: Does not bruise/bleed easily.   Psychiatric/Behavioral: Negative for depression and substance abuse.          Physical Exam  /68   Pulse 83   Ht 5' 4" (1.626 m)   Wt 93.9 kg (207 lb)   SpO2 98%   BMI 35.53 kg/m²     Constitutional:       General: She is not in acute distress.     Appearance: She is not " diaphoretic.   HENT:      Head: Normocephalic and atraumatic.   Eyes:      General: No scleral icterus.     Conjunctiva/sclera: Conjunctivae normal.   Cardiovascular:      Rate and Rhythm: Normal rate.   Pulmonary:      Effort: Pulmonary effort is normal. No respiratory distress.      Breath sounds: No stridor.   Abdominal:      General: There is no distension.      Palpations: Abdomen is soft.      Tenderness: There is no abdominal tenderness.   Musculoskeletal:         General: No tenderness. Normal range of motion.      Cervical back: Normal range of motion.   Skin:     General: Skin is warm.      Findings: No erythema or rash.   Neurological:      Mental Status: She is alert and oriented to person, place, and time.   Psychiatric:         Mood and Affect: Affect normal.         Judgment: Judgment normal.     Grade 2 ptosis nac widened, no mass no discharge bilateral  [place] scars well healed  Abdominal and lumbar lipodystrophy  Rectus diastasis  Skin pinch 3cm    No hernias appreciated  Mons pubis with some descent        IMPRESSION:  Abdominal lipodystrophy skin excess and diastasis      PLAN:  1).LipoAbdominoplasty, liposuction flank, lower back (supine), abdomen, chest wall, revision mastopexy-5.5H  First assist requested.   Will send note to primary to optimize iron and H&H levels prior to surgery.   prealb 18- protein supplements BID    Revision bbr central pedicle  Sarah de ken abdominoplasty  Melecio Hernández take toradol  [facility] [id number] benign    INFORMED CONSENT  The proposed procedure, any treatment options, expected and possible outcomes including complications, any necessary perioperative medicinal and activity restrictions has, expected recovery and potential disability were fully reviewed with the patient. Permission to obtain photographs before, during, and after surgery was also granted. An opportunity to ask questions was given, and written informed consent was given to proceed. This Informed Consent  discussion took place prior to the signing the consent form.         Risks of the procedure:  incomplete removal of skin   recurrence  incomplete correction, failure to correct problem, worsening of problem  poor cosmetic outcome  need for further surgery  bleeding  infection  thick/poor scarring  wound separation, failure to heal  disability  pain  Numbness  Nerve injury  problems with anesthesia  blood clot, deep venous thrombosis, pulmonary embolus  heart attack  stroke  death     The patient demonstrated understanding of risks and consent signed with RN witness.

## 2022-05-27 LAB
25(OH)D3 SERPL-MCNC: 24 NG/ML
BASOPHILS NFR BLD: 0.3 % (ref 0–3)
EOSINOPHIL NFR BLD: 0 % (ref 1–3)
ERYTHROCYTE [DISTWIDTH] IN BLOOD BY AUTOMATED COUNT: 16.5 % (ref 12.5–18)
FERRITIN SERPL-MCNC: 8 NG/ML (ref 8–388)
HCT VFR BLD AUTO: 34.9 % (ref 37–47)
HGB BLD-MCNC: 10.7 G/DL (ref 12–16)
HYPOCHROMIA BLD QL SMEAR: NORMAL
LYMPHOCYTES NFR BLD: 33.5 % (ref 25–40)
MCH RBC QN AUTO: 26.8 PG (ref 27–31.2)
MCHC RBC AUTO-ENTMCNC: 30.7 G/DL (ref 31.8–35.4)
MCV RBC AUTO: 87.5 FL (ref 80–97)
MONOCYTES NFR BLD: 10 % (ref 1–15)
NEUTROPHILS # BLD AUTO: 3.83 10*3/UL (ref 1.8–7.7)
NEUTROPHILS NFR BLD: 55.8 % (ref 37–80)
NUCLEATED RED BLOOD CELLS: 0 %
PLATELETS: 420 10*3/UL (ref 142–424)
RBC # BLD AUTO: 3.99 10*6/UL (ref 4.2–5.4)
WBC # BLD: 6.9 10*3/UL (ref 4.6–10.2)

## 2022-05-31 ENCOUNTER — OUTSIDE PLACE OF SERVICE (OUTPATIENT)
Dept: PLASTIC SURGERY | Facility: CLINIC | Age: 52
End: 2022-05-31

## 2022-05-31 PROCEDURE — 15877 PR SUCT ASSIS LIPECTOMY,TRUNK: ICD-10-PCS | Mod: CSM,,, | Performed by: SURGERY

## 2022-05-31 PROCEDURE — 15830 PR EXCISE EXCESS SKIN TISSUE,ABDOMEN: ICD-10-PCS | Mod: CSM,,, | Performed by: SURGERY

## 2022-05-31 PROCEDURE — 15877 SUCTION LIPECTOMY TRUNK: CPT | Mod: CSM,,, | Performed by: SURGERY

## 2022-05-31 PROCEDURE — 15830 EXC EXCESSIVE SKIN ABDOMEN: CPT | Mod: CSM,,, | Performed by: SURGERY

## 2022-05-31 PROCEDURE — 19318 PR REDUCTION OF LARGE BREAST: ICD-10-PCS | Mod: CSM,50,, | Performed by: SURGERY

## 2022-05-31 PROCEDURE — 19318 BREAST REDUCTION: CPT | Mod: CSM,50,, | Performed by: SURGERY

## 2022-06-01 LAB — SPECIMEN TO PATHOLOGY: NORMAL

## 2022-06-03 ENCOUNTER — CLINICAL SUPPORT (OUTPATIENT)
Dept: PLASTIC SURGERY | Facility: CLINIC | Age: 52
End: 2022-06-03
Payer: COMMERCIAL

## 2022-06-03 VITALS
SYSTOLIC BLOOD PRESSURE: 121 MMHG | HEIGHT: 64 IN | WEIGHT: 205 LBS | DIASTOLIC BLOOD PRESSURE: 68 MMHG | HEART RATE: 104 BPM | BODY MASS INDEX: 35 KG/M2

## 2022-06-03 DIAGNOSIS — Z98.890 STATUS POST COSMETIC PLASTIC SURGERY: Primary | ICD-10-CM

## 2022-06-03 NOTE — PROGRESS NOTES
"        POST-OPERATIVE EXAM    CC  Chief Complaint   Patient presents with    Wound Check     Nurse visit to check drains and incisions       PROCEDURE  Bilateral breast lift with lipoabdominoplasty 5/31/22    SUBJECTIVE  Moderate bruising and swelling to bilateral breast. Binder was wrapped up to mid breast. Swelling to mons noted as well.  Drains with out put around 180-210cc total on each side. Incisions intake. There is a small area of thin skin noted to left breast left of the areola. Instructed pt to watch this area for any breakdown or further discoloration.  Abdominal binder repositioned on pt and instructed pt to keep the top of the binder under her breast away from the incisions. ABD pads placed over all incisions.  Bra in place. Ace wrap lightly placed around breast to help with the extra swelling          PHYSICAL EXAM  /68   Pulse 104   Ht 5' 4" (1.626 m)   Wt 93 kg (205 lb)   BMI 35.19 kg/m²     " mild

## 2022-06-06 ENCOUNTER — OFFICE VISIT (OUTPATIENT)
Dept: PLASTIC SURGERY | Facility: CLINIC | Age: 52
End: 2022-06-06
Payer: COMMERCIAL

## 2022-06-06 VITALS
DIASTOLIC BLOOD PRESSURE: 69 MMHG | SYSTOLIC BLOOD PRESSURE: 103 MMHG | HEART RATE: 100 BPM | OXYGEN SATURATION: 100 % | RESPIRATION RATE: 18 BRPM | BODY MASS INDEX: 35 KG/M2 | HEIGHT: 64 IN | TEMPERATURE: 97 F | WEIGHT: 205 LBS

## 2022-06-06 DIAGNOSIS — Z98.890 STATUS POST COSMETIC PLASTIC SURGERY: Primary | ICD-10-CM

## 2022-06-06 PROCEDURE — 3008F BODY MASS INDEX DOCD: CPT | Mod: CPTII,S$GLB,, | Performed by: SURGERY

## 2022-06-06 PROCEDURE — 3078F PR MOST RECENT DIASTOLIC BLOOD PRESSURE < 80 MM HG: ICD-10-PCS | Mod: CPTII,S$GLB,, | Performed by: SURGERY

## 2022-06-06 PROCEDURE — 99024 PR POST-OP FOLLOW-UP VISIT: ICD-10-PCS | Mod: S$GLB,,, | Performed by: SURGERY

## 2022-06-06 PROCEDURE — 99024 POSTOP FOLLOW-UP VISIT: CPT | Mod: S$GLB,,, | Performed by: SURGERY

## 2022-06-06 PROCEDURE — 3078F DIAST BP <80 MM HG: CPT | Mod: CPTII,S$GLB,, | Performed by: SURGERY

## 2022-06-06 PROCEDURE — 1159F MED LIST DOCD IN RCRD: CPT | Mod: CPTII,S$GLB,, | Performed by: SURGERY

## 2022-06-06 PROCEDURE — 3074F SYST BP LT 130 MM HG: CPT | Mod: CPTII,S$GLB,, | Performed by: SURGERY

## 2022-06-06 PROCEDURE — 3008F PR BODY MASS INDEX (BMI) DOCUMENTED: ICD-10-PCS | Mod: CPTII,S$GLB,, | Performed by: SURGERY

## 2022-06-06 PROCEDURE — 1159F PR MEDICATION LIST DOCUMENTED IN MEDICAL RECORD: ICD-10-PCS | Mod: CPTII,S$GLB,, | Performed by: SURGERY

## 2022-06-06 PROCEDURE — 3074F PR MOST RECENT SYSTOLIC BLOOD PRESSURE < 130 MM HG: ICD-10-PCS | Mod: CPTII,S$GLB,, | Performed by: SURGERY

## 2022-06-06 RX ORDER — FUROSEMIDE 40 MG/1
40 TABLET ORAL DAILY
Qty: 5 TABLET | Refills: 0 | Status: SHIPPED | OUTPATIENT
Start: 2022-06-06 | End: 2022-09-15

## 2022-06-06 RX ORDER — POTASSIUM CHLORIDE 20 MEQ/1
20 TABLET, EXTENDED RELEASE ORAL DAILY
Qty: 5 TABLET | Refills: 0 | Status: SHIPPED | OUTPATIENT
Start: 2022-06-06 | End: 2022-06-11

## 2022-06-06 NOTE — PROGRESS NOTES
"POST-OPERATIVE EXAM    CC  none    PROCEDURE  Revision BBR, Lipoabdominoplasty 531    SUBJECTIVE  No fevers or pain uncontrolled.    Denies fevers, drainage, or wound concerns.     PHYSICAL EXAM  /69   Pulse 100   Temp 97.3 °F (36.3 °C)   Resp 18   Ht 5' 4" (1.626 m)   Wt 93 kg (205 lb)   SpO2 100%   BMI 35.19 kg/m²   Breast symmetry and shape good, soft no fat necrosis  Minor bruising   Healing primarily  Scars pink, without hypertrophy  No masses  Nipples sensate  NAC healthy, viable    Surgical site  Incisions clean dry and intact  No seroma or hematoma        PATHOLOGY  6/1/22 [facility]   1. RIGHT BREAST TISSUE,  REDUCTION MAMMOPLASTY:       - BENIGN MAMMARY TISSUE  and  SKIN, WEIGHT 330 GRAMS.   2. LEFT BREAST TISSUE,  REDUCTION MAMMOPLASTY:       - BENIGN MAMMARY TISSUE  and  SKIN, WEIGHT 315 GRAMS.     ASSESSMENT  No signs of complications.  Patient is doing well after surgery.       PLAN  Care/instructions were reviewed, written handout given (see AVS).  Continue sport bra/surgical bra  No upper body exercise/heavy lifting x 1 month  Ok to shower  Lasix K, keep hydrated  Walking around the block  F/u in 1 weeks        WOUND CARE INSTRUCTIONS  BATHING  You may shower normally. No soaking tub bath or swimming pool until cleared by Dr. Bocanegra.       MEDICATIONS  Continue your usual medications and vitamins    SCAR MANAGEMENT  Scars may take over 1 year to mature.  Some scars will remain pink, dark purple, and possibly raised for 6-9 months after surgery.  After one year, scars often become flatter, smoother, and may change color.  After removal of the tape, suture removal, or when glue was used, apply a thin layer of Aquaphor (available at any drugstore) or antibiotic ointment to the scars for another 2 weeks.  Begin silicone when scars smooth, generally starting about 2 weeks after surgery.  Medical grade silicone gel is available on companies' web sites or on amazon.com  Brands recommended:  - " Biocorneum  - Skin medica Scar Recovery Gel Skin Medica  Massage the scar twice daily for about 30 seconds

## 2022-06-10 ENCOUNTER — CLINICAL SUPPORT (OUTPATIENT)
Dept: PLASTIC SURGERY | Facility: CLINIC | Age: 52
End: 2022-06-10
Payer: COMMERCIAL

## 2022-06-10 DIAGNOSIS — Z98.890 POST-OPERATIVE STATE: Primary | ICD-10-CM

## 2022-06-10 NOTE — PROGRESS NOTES
Pt arrived but did not need help getting back into her garment.  She does report having severe anxiety while wearing it for long periods of time but I encouraged her to wear as long and as much as she can since this will help with her overall results.  Her remaining drain had an output of 60cc in 24 hour period. We should be able to remove it on Monday. Foam given to pt to help with mid abdomen bulging.  No other complaints she is otherwise feeling well and able to stand pretty straight. Instructed to call with any questions or concerns

## 2022-06-12 ENCOUNTER — PATIENT MESSAGE (OUTPATIENT)
Dept: PLASTIC SURGERY | Facility: CLINIC | Age: 52
End: 2022-06-12
Payer: COMMERCIAL

## 2022-06-13 ENCOUNTER — OFFICE VISIT (OUTPATIENT)
Dept: PLASTIC SURGERY | Facility: CLINIC | Age: 52
End: 2022-06-13
Payer: COMMERCIAL

## 2022-06-13 VITALS
DIASTOLIC BLOOD PRESSURE: 64 MMHG | SYSTOLIC BLOOD PRESSURE: 97 MMHG | OXYGEN SATURATION: 98 % | BODY MASS INDEX: 35 KG/M2 | WEIGHT: 205 LBS | HEART RATE: 79 BPM | HEIGHT: 64 IN

## 2022-06-13 DIAGNOSIS — Z98.890 STATUS POST COSMETIC PLASTIC SURGERY: ICD-10-CM

## 2022-06-13 DIAGNOSIS — Z98.890 POST-OPERATIVE STATE: Primary | ICD-10-CM

## 2022-06-13 PROCEDURE — 99024 PR POST-OP FOLLOW-UP VISIT: ICD-10-PCS | Mod: S$GLB,,, | Performed by: SURGERY

## 2022-06-13 PROCEDURE — 3008F BODY MASS INDEX DOCD: CPT | Mod: CPTII,S$GLB,, | Performed by: SURGERY

## 2022-06-13 PROCEDURE — 99024 POSTOP FOLLOW-UP VISIT: CPT | Mod: S$GLB,,, | Performed by: SURGERY

## 2022-06-13 PROCEDURE — 3074F SYST BP LT 130 MM HG: CPT | Mod: CPTII,S$GLB,, | Performed by: SURGERY

## 2022-06-13 PROCEDURE — 3078F PR MOST RECENT DIASTOLIC BLOOD PRESSURE < 80 MM HG: ICD-10-PCS | Mod: CPTII,S$GLB,, | Performed by: SURGERY

## 2022-06-13 PROCEDURE — 3074F PR MOST RECENT SYSTOLIC BLOOD PRESSURE < 130 MM HG: ICD-10-PCS | Mod: CPTII,S$GLB,, | Performed by: SURGERY

## 2022-06-13 PROCEDURE — 3008F PR BODY MASS INDEX (BMI) DOCUMENTED: ICD-10-PCS | Mod: CPTII,S$GLB,, | Performed by: SURGERY

## 2022-06-13 PROCEDURE — 1159F MED LIST DOCD IN RCRD: CPT | Mod: CPTII,S$GLB,, | Performed by: SURGERY

## 2022-06-13 PROCEDURE — 3078F DIAST BP <80 MM HG: CPT | Mod: CPTII,S$GLB,, | Performed by: SURGERY

## 2022-06-13 PROCEDURE — 1159F PR MEDICATION LIST DOCUMENTED IN MEDICAL RECORD: ICD-10-PCS | Mod: CPTII,S$GLB,, | Performed by: SURGERY

## 2022-06-13 NOTE — PROGRESS NOTES
"POST-OPERATIVE EXAM    CC  Chief Complaint   Patient presents with    Post-op Evaluation       PROCEDURE  Revision BBR, Lipoabdominoplasty 531    Pt has been wearing binder and garment as much as possible but not full time. Pts PCP did prescribe Klonopin and is feeling better and not having panic attacks anymore and able to sleep at night.     SUBJECTIVE     Denies fevers, drainage, or wound concerns.     PHYSICAL EXAM  BP 97/64   Pulse 79   Ht 5' 4" (1.626 m)   Wt 93 kg (205 lb)   SpO2 98%   BMI 35.19 kg/m²     Breast symmetry and shape good, soft no fat necrosis  Minor bruising   Healing primarily  Scars pink, without hypertrophy  No masses  Nipples sensate  NAC healthy, viable  Left nipple with some eschar inferior lateral periwound erythema  Right T with some eschar     No seroma or hematoma  Lateral flanks with some edema and contour irregularity       ASSESSMENT  Partial nipple necrosis 20%    PLAN  Local wound care- wash with VASHE and cover nipple and T with baci and adaptec, daily change  No abx at this time  rtc Monday  Needs to start lymphatic massage and wear garments 24h  "

## 2022-06-20 ENCOUNTER — OFFICE VISIT (OUTPATIENT)
Dept: PLASTIC SURGERY | Facility: CLINIC | Age: 52
End: 2022-06-20
Payer: COMMERCIAL

## 2022-06-20 VITALS
HEIGHT: 64 IN | HEART RATE: 108 BPM | WEIGHT: 205 LBS | DIASTOLIC BLOOD PRESSURE: 76 MMHG | SYSTOLIC BLOOD PRESSURE: 125 MMHG | BODY MASS INDEX: 35 KG/M2 | OXYGEN SATURATION: 98 %

## 2022-06-20 DIAGNOSIS — Z98.890: Primary | ICD-10-CM

## 2022-06-20 PROCEDURE — 3078F DIAST BP <80 MM HG: CPT | Mod: CPTII,S$GLB,, | Performed by: SURGERY

## 2022-06-20 PROCEDURE — 3008F PR BODY MASS INDEX (BMI) DOCUMENTED: ICD-10-PCS | Mod: CPTII,S$GLB,, | Performed by: SURGERY

## 2022-06-20 PROCEDURE — 99024 PR POST-OP FOLLOW-UP VISIT: ICD-10-PCS | Mod: S$GLB,,, | Performed by: SURGERY

## 2022-06-20 PROCEDURE — 1159F MED LIST DOCD IN RCRD: CPT | Mod: CPTII,S$GLB,, | Performed by: SURGERY

## 2022-06-20 PROCEDURE — 1159F PR MEDICATION LIST DOCUMENTED IN MEDICAL RECORD: ICD-10-PCS | Mod: CPTII,S$GLB,, | Performed by: SURGERY

## 2022-06-20 PROCEDURE — 3008F BODY MASS INDEX DOCD: CPT | Mod: CPTII,S$GLB,, | Performed by: SURGERY

## 2022-06-20 PROCEDURE — 99024 POSTOP FOLLOW-UP VISIT: CPT | Mod: S$GLB,,, | Performed by: SURGERY

## 2022-06-20 PROCEDURE — 3078F PR MOST RECENT DIASTOLIC BLOOD PRESSURE < 80 MM HG: ICD-10-PCS | Mod: CPTII,S$GLB,, | Performed by: SURGERY

## 2022-06-20 PROCEDURE — 3074F PR MOST RECENT SYSTOLIC BLOOD PRESSURE < 130 MM HG: ICD-10-PCS | Mod: CPTII,S$GLB,, | Performed by: SURGERY

## 2022-06-20 PROCEDURE — 3074F SYST BP LT 130 MM HG: CPT | Mod: CPTII,S$GLB,, | Performed by: SURGERY

## 2022-06-20 NOTE — PROGRESS NOTES
"POST-OPERATIVE EXAM    CC  Chief Complaint   Patient presents with    Post-op Evaluation       PROCEDURE  Revision BBR, Lipoabdominoplasty 531      SUBJECTIVE    Denies fevers, drainage  Left nipple wound, applying baci and adaptic    PHYSICAL EXAM  /76   Pulse 108   Ht 5' 4" (1.626 m)   Wt 93 kg (205 lb)   SpO2 98%   BMI 35.19 kg/m²     Breast symmetry and shape good, soft no fat necrosis  Scars pink, without hypertrophy  No masses  Nipples sensate  NAC healthy, viable  Left nipple with inferior ischemia and necrosis fibrin exudate no erythema or drainage  3 x 4 cm  Right 1 x 1 cm area of fibrinous exudate inferior to nipple        ASSESSMENT  Patient is doing well after surgery.   Allow time for contour and scar maturation.    PLAN  Santyl mixed with baci applied to breast breakdown areas. Adaptic and ABD pad applied. Pt to follow up in 1 week.  Begin lymphatic massage      WOUND CARE INSTRUCTIONS  BATHING  You may shower normally. No soaking tub bath or swimming pool until cleared by Dr. Bocanegra.    WOUND CARE  Santyl baci daily      SUTURES  Sutures do not require further removal    ACTIVITY  You may resume moderate exercise (walking, incline walking, stationary bike)   You may progress to full exercise after 4 weeks.  Avoid heavy lifting, running, swimming, strenuous activity for 4 weeks.  Avoid travel out of town for 4 weeks.    MEDICATIONS  Take pain medication as needed:  Tylenol (acetominophen) 500 mg every 6 hours for mild pain.  Motrin (Ibuprofen) 600 mg every 8 hours for mild pain.  DO NOT exceed 4 grams of Tylenol in a 24 h period  Continue your usual medications and vitamins    SCAR MANAGEMENT  Scars may take over 1 year to mature.  Some scars will remain pink, dark purple, and possibly raised for 6-9 months after surgery.  After one year, scars often become flatter, smoother, and may change color.  After removal of the tape, suture removal, or when glue was used, apply a thin layer of " Aquaphor (available at any drugstore) or antibiotic ointment to the scars for another 2 weeks.  Begin silicone when scars smooth, generally starting about 2 weeks after surgery.  Medical grade silicone gel is available on companies' web sites or on amazon.com  Brands recommended:  - Biocorneum  - Skin medica Scar Recovery Gel Skin Medica  Massage the scar twice daily for about 30 seconds

## 2022-06-21 ENCOUNTER — PATIENT MESSAGE (OUTPATIENT)
Dept: PLASTIC SURGERY | Facility: CLINIC | Age: 52
End: 2022-06-21
Payer: COMMERCIAL

## 2022-06-27 ENCOUNTER — TELEPHONE (OUTPATIENT)
Dept: PLASTIC SURGERY | Facility: CLINIC | Age: 52
End: 2022-06-27

## 2022-06-27 NOTE — TELEPHONE ENCOUNTER
----- Message from Criss Jackson sent at 6/27/2022  2:10 PM CDT -----  Contact: PT  .Type:  Sooner Appointment Request    Caller is requesting a sooner appointment.  Caller declined first available appointment listed below.  Caller will not accept being placed on the waitlist and is requesting a message be sent to doctor.  Name of Caller: Jessie  When is the first available appointment? 07/25/2022  Symptoms: r/s post op appt   Would the patient rather a call back or a response via [facility]?  Callback   Best Call Back Number: .[phone] (home)        Additional Information:

## 2022-07-05 ENCOUNTER — OFFICE VISIT (OUTPATIENT)
Dept: PLASTIC SURGERY | Facility: CLINIC | Age: 52
End: 2022-07-05
Payer: COMMERCIAL

## 2022-07-05 VITALS — BODY MASS INDEX: 33.63 KG/M2 | HEIGHT: 64 IN | WEIGHT: 197 LBS

## 2022-07-05 DIAGNOSIS — Z98.890: Primary | ICD-10-CM

## 2022-07-05 PROCEDURE — 3008F BODY MASS INDEX DOCD: CPT | Mod: CPTII,S$GLB,, | Performed by: SURGERY

## 2022-07-05 PROCEDURE — 3008F PR BODY MASS INDEX (BMI) DOCUMENTED: ICD-10-PCS | Mod: CPTII,S$GLB,, | Performed by: SURGERY

## 2022-07-05 PROCEDURE — 99024 PR POST-OP FOLLOW-UP VISIT: ICD-10-PCS | Mod: S$GLB,,, | Performed by: SURGERY

## 2022-07-05 PROCEDURE — 99024 POSTOP FOLLOW-UP VISIT: CPT | Mod: S$GLB,,, | Performed by: SURGERY

## 2022-07-05 NOTE — PROGRESS NOTES
"POST-OPERATIVE EXAM    CC  Chief Complaint   Patient presents with    Post-op Evaluation       PROCEDURE  Revision BBR, Lipoabdominoplasty 531      SUBJECTIVE  Preoperative symptoms have improved.  Pt c/o soreness at both T spots.  Denies fevers, drainage, or wound concerns.     PHYSICAL EXAM  Ht 5' 4" (1.626 m)   Wt 89.4 kg (197 lb)   BMI 33.81 kg/m²    areas of right breast subareolar and left breast subareolar and T are healign better with minimal fibrin  abd wall with lipodystrophy remaining and lower fullness, feels more like muscle laxity       ASSESSMENT   No signs of complications.  Patient is doing well after surgery.   Allow time for contour and scar maturation.    PLAN     Pt has been using the Bacitracin and the Santyl. Pt will continue using the Bacitracin until her Santyl comes in, along with the adaptic. Pt is being referred to [facility] with no restrictions. Pt will follow up in 2 weeks.      WOUND CARE INSTRUCTIONS  BATHING  You may shower normally. No soaking tub bath or swimming pool until cleared by Dr. Bocanegra.    WOUND CARE  See above    SUTURES  Sutures do not require further removal    ACTIVITY  FULL    MEDICATIONS  Continue your usual medications and vitamins    SCAR MANAGEMENT  Scars may take over 1 year to mature.  Some scars will remain pink, dark purple, and possibly raised for 6-9 months after surgery.  After one year, scars often become flatter, smoother, and may change color.  After removal of the tape, suture removal, or when glue was used, apply a thin layer of Aquaphor (available at any drugstore) or antibiotic ointment to the scars for another 2 weeks.  Begin silicone when scars smooth, generally starting about 2 weeks after surgery.  Medical grade silicone gel is available on companies' web sites or on amazon.com  Brands recommended:  - Biocorneum  - Skin medica Scar Recovery Gel Skin Medica  Massage the scar twice daily for about 30 seconds      "

## 2022-07-13 ENCOUNTER — TELEPHONE (OUTPATIENT)
Dept: PLASTIC SURGERY | Facility: CLINIC | Age: 52
End: 2022-07-13
Payer: COMMERCIAL

## 2022-07-13 NOTE — TELEPHONE ENCOUNTER
----- Message from Malaika Ta sent at 7/13/2022 11:21 AM CDT -----  Contact: Patient  Patient need the nurse to call her      Call back # [phone]

## 2022-07-18 ENCOUNTER — OFFICE VISIT (OUTPATIENT)
Dept: PLASTIC SURGERY | Facility: CLINIC | Age: 52
End: 2022-07-18
Payer: COMMERCIAL

## 2022-07-18 VITALS
SYSTOLIC BLOOD PRESSURE: 102 MMHG | DIASTOLIC BLOOD PRESSURE: 55 MMHG | HEIGHT: 64 IN | BODY MASS INDEX: 33.26 KG/M2 | HEART RATE: 88 BPM | OXYGEN SATURATION: 99 % | WEIGHT: 194.81 LBS

## 2022-07-18 DIAGNOSIS — Z98.890 STATUS POST COSMETIC PLASTIC SURGERY: Primary | ICD-10-CM

## 2022-07-18 PROCEDURE — 3078F DIAST BP <80 MM HG: CPT | Mod: CPTII,S$GLB,, | Performed by: SURGERY

## 2022-07-18 PROCEDURE — 3074F PR MOST RECENT SYSTOLIC BLOOD PRESSURE < 130 MM HG: ICD-10-PCS | Mod: CPTII,S$GLB,, | Performed by: SURGERY

## 2022-07-18 PROCEDURE — 3078F PR MOST RECENT DIASTOLIC BLOOD PRESSURE < 80 MM HG: ICD-10-PCS | Mod: CPTII,S$GLB,, | Performed by: SURGERY

## 2022-07-18 PROCEDURE — 1159F MED LIST DOCD IN RCRD: CPT | Mod: CPTII,S$GLB,, | Performed by: SURGERY

## 2022-07-18 PROCEDURE — 3074F SYST BP LT 130 MM HG: CPT | Mod: CPTII,S$GLB,, | Performed by: SURGERY

## 2022-07-18 PROCEDURE — 99024 POSTOP FOLLOW-UP VISIT: CPT | Mod: S$GLB,,, | Performed by: SURGERY

## 2022-07-18 PROCEDURE — 1159F PR MEDICATION LIST DOCUMENTED IN MEDICAL RECORD: ICD-10-PCS | Mod: CPTII,S$GLB,, | Performed by: SURGERY

## 2022-07-18 PROCEDURE — 3008F PR BODY MASS INDEX (BMI) DOCUMENTED: ICD-10-PCS | Mod: CPTII,S$GLB,, | Performed by: SURGERY

## 2022-07-18 PROCEDURE — 3008F BODY MASS INDEX DOCD: CPT | Mod: CPTII,S$GLB,, | Performed by: SURGERY

## 2022-07-18 PROCEDURE — 99024 PR POST-OP FOLLOW-UP VISIT: ICD-10-PCS | Mod: S$GLB,,, | Performed by: SURGERY

## 2022-07-18 NOTE — PROGRESS NOTES
"POST-OPERATIVE EXAM    CC  Chief Complaint   Patient presents with    Post-op Evaluation       PROCEDURE  Revision BBR, Lipoabdominoplasty 531      SUBJECTIVE     Bilateral breast wounds are getting smaller. Pt c/o right abdominal pain above her incision.     PHYSICAL EXAM  BP (!) 102/55   Pulse 88   Ht 5' 4" (1.626 m)   Wt 88.4 kg (194 lb 12.8 oz)   SpO2 99%   BMI 33.44 kg/m²   Breast symmetry and shape good, soft no fat necrosis  Wounds along vertical in both breast healign,s table    ASSESSMENT   No signs of complications.  Patient is doing well after surgery.   Allow time for contour and scar maturation.    PLAN   Pt instructed to do scar massage. Pt also instructed to go back to [facility] for scar massage. Pt will continue with Santyl and now start with the woven gauze instead of the adaptic. Pt will follow up at 3 month geeta.    "

## 2022-08-11 ENCOUNTER — PATIENT MESSAGE (OUTPATIENT)
Dept: OBSTETRICS AND GYNECOLOGY | Facility: CLINIC | Age: 52
End: 2022-08-11
Payer: COMMERCIAL

## 2022-08-11 DIAGNOSIS — Z78.0 MENOPAUSE: Primary | ICD-10-CM

## 2022-08-11 RX ORDER — TESTOSTERONE CYPIONATE 200 MG/ML
200 INJECTION, SOLUTION INTRAMUSCULAR
Qty: 1 ML | Refills: 0 | Status: SHIPPED | OUTPATIENT
Start: 2022-08-11 | End: 2022-08-12 | Stop reason: SDUPTHER

## 2022-08-17 RX ORDER — ESTRADIOL 0.1 MG/G
1 CREAM VAGINAL
Qty: 42.5 G | Refills: 1 | Status: SHIPPED | OUTPATIENT
Start: 2022-08-18 | End: 2023-09-21

## 2022-08-17 RX ORDER — TESTOSTERONE CYPIONATE 200 MG/ML
200 INJECTION, SOLUTION INTRAMUSCULAR
Qty: 1 ML | Refills: 0 | Status: SHIPPED | OUTPATIENT
Start: 2022-08-17 | End: 2022-10-27 | Stop reason: SDUPTHER

## 2022-09-08 ENCOUNTER — OFFICE VISIT (OUTPATIENT)
Dept: PLASTIC SURGERY | Facility: CLINIC | Age: 52
End: 2022-09-08
Payer: COMMERCIAL

## 2022-09-08 VITALS
HEART RATE: 88 BPM | WEIGHT: 192 LBS | OXYGEN SATURATION: 97 % | BODY MASS INDEX: 32.78 KG/M2 | HEIGHT: 64 IN | SYSTOLIC BLOOD PRESSURE: 127 MMHG | DIASTOLIC BLOOD PRESSURE: 81 MMHG

## 2022-09-08 DIAGNOSIS — Z98.890 STATUS POST COSMETIC PLASTIC SURGERY: Primary | ICD-10-CM

## 2022-09-08 PROCEDURE — 3074F PR MOST RECENT SYSTOLIC BLOOD PRESSURE < 130 MM HG: ICD-10-PCS | Mod: CPTII,S$GLB,, | Performed by: SURGERY

## 2022-09-08 PROCEDURE — 99024 PR POST-OP FOLLOW-UP VISIT: ICD-10-PCS | Mod: S$GLB,,, | Performed by: SURGERY

## 2022-09-08 PROCEDURE — 99024 POSTOP FOLLOW-UP VISIT: CPT | Mod: S$GLB,,, | Performed by: SURGERY

## 2022-09-08 PROCEDURE — 3079F DIAST BP 80-89 MM HG: CPT | Mod: CPTII,S$GLB,, | Performed by: SURGERY

## 2022-09-08 PROCEDURE — 3008F BODY MASS INDEX DOCD: CPT | Mod: CPTII,S$GLB,, | Performed by: SURGERY

## 2022-09-08 PROCEDURE — 3008F PR BODY MASS INDEX (BMI) DOCUMENTED: ICD-10-PCS | Mod: CPTII,S$GLB,, | Performed by: SURGERY

## 2022-09-08 PROCEDURE — 3079F PR MOST RECENT DIASTOLIC BLOOD PRESSURE 80-89 MM HG: ICD-10-PCS | Mod: CPTII,S$GLB,, | Performed by: SURGERY

## 2022-09-08 PROCEDURE — 3074F SYST BP LT 130 MM HG: CPT | Mod: CPTII,S$GLB,, | Performed by: SURGERY

## 2022-09-08 NOTE — PROGRESS NOTES
CC  Chief Complaint   Patient presents with    Follow-up     3.5 month post op visit      Referring Provider- self  PCP: Clem Pierce MD    HPI  History from patient, chart, referring provider  Jessie Bush is a 52 y.o. female presenting 3 months after surgery.     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       PSH  Past Surgical History:   Procedure Laterality Date    AUGMENTATION OF BREAST  05/2022    COSMETIC SURGERY Bilateral 05/31/2022    bilateral breast lift with abdominoplasty    DIAGNOSTIC LAPAROSCOPY      LAPAROSCOPIC SLEEVE GASTRECTOMY      LIPOSUCTION  05/2022    LOOP ELECTROSURGICAL EXCISION PROCEDURE (LEEP)      REDUCTION OF BOTH BREASTS  2000    SINUS SURGERY  09/01/2022    TONSILLECTOMY      TOTAL ABDOMINAL HYSTERECTOMY W/ BILATERAL SALPINGOOPHORECTOMY  2000         Family History   Problem Relation Age of Onset    No Known Problems Mother     No Known Problems Father     Colon cancer Maternal Grandfather     Breast cancer Neg Hx     Ovarian cancer Neg Hx     Uterine cancer Neg Hx     Melanoma Neg Hx     Pancreatic cancer Neg Hx     Prostate cancer Neg Hx        MEDICATIONS  Outpatient Medications Marked as Taking for the 9/8/22 encounter (Office Visit) with Ashleigh Bocanegra MD   Medication Sig Dispense Refill    benralizumab 30 mg/mL AtIn Inject 30 mg into the skin. Every 2 months, used for asthma      clonazepam (KLONOPIN ORAL) Take by mouth.      escitalopram oxalate (LEXAPRO) 10 MG tablet       estradioL (ESTRACE) 0.01 % (0.1 mg/gram) vaginal cream Place 1 g vaginally twice a week. 42.5 g 1    testosterone cypionate (DEPOTESTOTERONE CYPIONATE) 200 mg/mL injection Inject 1 mL (200 mg total) into the muscle  "every 12 weeks. Can given injection every 8-12 weeks 1 mL 0    [DISCONTINUED] SYMBICORT 160-4.5 mcg/actuation HFAA Inhale 2 puffs into the lungs 2 (two) times daily.         ALLERGIES Review of patient's allergies indicates:  No Known Allergies    SOCIAL HISTORY  Tobacco:   Social History     Tobacco Use   Smoking Status Never   Smokeless Tobacco Never     EtOH:   Social History     Substance and Sexual Activity   Alcohol Use Never         REVIEW OF SYSTEMS  Constitutional: Negative for chills and fever.   HENT: Negative for congestion.    Eyes: Negative for blurred vision and double vision.   Respiratory: Negative for cough and shortness of breath.    Cardiovascular: Negative for chest pain and palpitations.   Gastrointestinal: Negative for abdominal pain, nausea and vomiting.   Genitourinary: Negative for dysuria and frequency.   Musculoskeletal: Negative for back pain and neck pain.   Skin: Negative for itching and rash.   Neurological: Negative for dizziness, seizures and headaches.   Endo/Heme/Allergies: Does not bruise/bleed easily.   Psychiatric/Behavioral: Negative for depression and substance abuse.       PHYSICAL EXAM  /81   Pulse 88   Ht 5' 4" (1.626 m)   Wt 87.1 kg (192 lb)   SpO2 97%   BMI 32.96 kg/m²     Constitutional: She is oriented to person, place, and time. She appears well-developed and well-nourished.   HENT: Normocephalic and atraumatic.   Neck: Normal range of motion. Neck supple. No JVD present.   Cardiovascular: Normal rate, regular rhythm and normal heart sounds.    Pulmonary/Chest: Effort normal. No respiratory distress.   Musculoskeletal: Normal range of motion. She exhibits no edema or deformity.   Neurological: She is alert and oriented to person, place, and time. No sensory deficit. She exhibits normal muscle tone.   Skin: Skin is warm. No rash noted. No erythema.   Psychiatric: She has a normal mood and affect. Her behavior is normal.      Breast symmetry and shape good, " soft no fat necrosis  Healing primarily  Scars pink, without hypertrophy  No masses  Nipples sensate  NAC healthy, viable      ASSESSMENT  Encounter Diagnoses   Name Primary?    Status post cosmetic plastic surgery Yes       ?   PLAN   Bacitracin or Aquaphor and bandaide over wound twice daily  Consider closing in office if unable to get complete closure after trying the ointment.   Return in 4 weeks    ACTIVITY  No restrictions    MEDICATIONS  Continue your usual medications and vitamins per PCP.    SCAR MANAGEMENT  Scars may take over 1 year to mature.  Some scars will remain pink, dark purple, and possibly raised for 6-9 months after surgery.  After one year, scars often become flatter, smoother, and may change color.  Continue sun protection, scar gel and massage for this entire duration to optimize scar appearance.     Medical grade silicone gel is available on companies' web sites or on amazon.com  Brands recommended:  - Biocorneum  - Skin medica Scar Recovery Gel Skin Medica

## 2022-09-15 ENCOUNTER — OFFICE VISIT (OUTPATIENT)
Dept: OBSTETRICS AND GYNECOLOGY | Facility: CLINIC | Age: 52
End: 2022-09-15
Payer: COMMERCIAL

## 2022-09-15 VITALS
SYSTOLIC BLOOD PRESSURE: 106 MMHG | WEIGHT: 196 LBS | HEART RATE: 99 BPM | DIASTOLIC BLOOD PRESSURE: 71 MMHG | BODY MASS INDEX: 33.64 KG/M2

## 2022-09-15 DIAGNOSIS — N95.1 MENOPAUSE SYNDROME: ICD-10-CM

## 2022-09-15 DIAGNOSIS — Z01.419 GYNECOLOGIC EXAM NORMAL: Primary | ICD-10-CM

## 2022-09-15 DIAGNOSIS — Z12.31 ENCOUNTER FOR SCREENING MAMMOGRAM FOR MALIGNANT NEOPLASM OF BREAST: ICD-10-CM

## 2022-09-15 DIAGNOSIS — Z13.820 SCREENING FOR OSTEOPOROSIS: ICD-10-CM

## 2022-09-15 PROCEDURE — 3074F SYST BP LT 130 MM HG: CPT | Mod: CPTII,S$GLB,, | Performed by: NURSE PRACTITIONER

## 2022-09-15 PROCEDURE — 1159F MED LIST DOCD IN RCRD: CPT | Mod: CPTII,S$GLB,, | Performed by: NURSE PRACTITIONER

## 2022-09-15 PROCEDURE — 99396 PR PREVENTIVE VISIT,EST,40-64: ICD-10-PCS | Mod: S$GLB,,, | Performed by: NURSE PRACTITIONER

## 2022-09-15 PROCEDURE — 99396 PREV VISIT EST AGE 40-64: CPT | Mod: S$GLB,,, | Performed by: NURSE PRACTITIONER

## 2022-09-15 PROCEDURE — 3078F DIAST BP <80 MM HG: CPT | Mod: CPTII,S$GLB,, | Performed by: NURSE PRACTITIONER

## 2022-09-15 PROCEDURE — 1160F RVW MEDS BY RX/DR IN RCRD: CPT | Mod: CPTII,S$GLB,, | Performed by: NURSE PRACTITIONER

## 2022-09-15 PROCEDURE — 1160F PR REVIEW ALL MEDS BY PRESCRIBER/CLIN PHARMACIST DOCUMENTED: ICD-10-PCS | Mod: CPTII,S$GLB,, | Performed by: NURSE PRACTITIONER

## 2022-09-15 PROCEDURE — 3078F PR MOST RECENT DIASTOLIC BLOOD PRESSURE < 80 MM HG: ICD-10-PCS | Mod: CPTII,S$GLB,, | Performed by: NURSE PRACTITIONER

## 2022-09-15 PROCEDURE — 1159F PR MEDICATION LIST DOCUMENTED IN MEDICAL RECORD: ICD-10-PCS | Mod: CPTII,S$GLB,, | Performed by: NURSE PRACTITIONER

## 2022-09-15 PROCEDURE — 3008F PR BODY MASS INDEX (BMI) DOCUMENTED: ICD-10-PCS | Mod: CPTII,S$GLB,, | Performed by: NURSE PRACTITIONER

## 2022-09-15 PROCEDURE — 3074F PR MOST RECENT SYSTOLIC BLOOD PRESSURE < 130 MM HG: ICD-10-PCS | Mod: CPTII,S$GLB,, | Performed by: NURSE PRACTITIONER

## 2022-09-15 PROCEDURE — 3008F BODY MASS INDEX DOCD: CPT | Mod: CPTII,S$GLB,, | Performed by: NURSE PRACTITIONER

## 2022-09-15 RX ORDER — AZITHROMYCIN 250 MG/1
TABLET, FILM COATED ORAL
COMMUNITY
Start: 2022-09-13 | End: 2022-12-12

## 2022-09-15 RX ORDER — FERROUS SULFATE 324(65)MG
TABLET, DELAYED RELEASE (ENTERIC COATED) ORAL 2 TIMES DAILY
COMMUNITY
Start: 2022-08-19

## 2022-09-15 RX ORDER — SPIRONOLACTONE 50 MG/1
50 TABLET, FILM COATED ORAL EVERY MORNING
COMMUNITY
Start: 2022-07-27

## 2022-09-15 RX ORDER — DUPILUMAB 300 MG/2ML
INJECTION, SOLUTION SUBCUTANEOUS
COMMUNITY
Start: 2022-09-12

## 2022-09-15 RX ORDER — COLLAGENASE SANTYL 250 [ARB'U]/G
OINTMENT TOPICAL
COMMUNITY
Start: 2022-07-06 | End: 2023-09-21

## 2022-09-15 NOTE — PROGRESS NOTES
Subjective:       Patient ID: Jessie Bush is a 52 y.o. female.    Chief Complaint:  Well Woman      History of Present Illness  HPI  Annual Exam-Postmenopausal  Patient presents for annual exam. The patient has no complaints today. The patient is sexually active. GYN screening history: last pap: was abnormal: ascus  and last mammogram: was normal. The patient is taking hormone replacement therapy. Patient denies post-menopausal vaginal bleeding. The patient wears seatbelts: yes. She is interesting in other HRT options     Outpatient Medications Marked as Taking for the 9/15/22 encounter (Office Visit) with Aydee Dougherty NP   Medication Sig Dispense Refill    azithromycin (Z-CHIDI) 250 MG tablet       benralizumab 30 mg/mL AtIn Inject 30 mg into the skin. Every 2 months, used for asthma      clonazepam (KLONOPIN ORAL) Take by mouth.      DUPIXENT  mg/2 mL PnIj       escitalopram oxalate (LEXAPRO) 10 MG tablet       estradioL (ESTRACE) 0.01 % (0.1 mg/gram) vaginal cream Place 1 g vaginally twice a week. 42.5 g 1    ferrous sulfate 324 mg (65 mg iron) TbEC Take by mouth 2 (two) times daily.      SANTYL ointment APPLY 6.2 CM TO LEFT BREAST EVERY DAY      spironolactone (ALDACTONE) 50 MG tablet Take 50 mg by mouth every morning.      testosterone cypionate (DEPOTESTOTERONE CYPIONATE) 200 mg/mL injection Inject 1 mL (200 mg total) into the muscle every 12 weeks. Can given injection every 8-12 weeks 1 mL 0     Vitals:    09/15/22 0804   BP: 106/71   Pulse: 99   Weight: 88.9 kg (196 lb)       Past Medical History:   Diagnosis Date    Asthma     Endometriosis     Ovarian cyst      Past Surgical History:   Procedure Laterality Date    AUGMENTATION OF BREAST  05/2022    COSMETIC SURGERY Bilateral 05/31/2022    bilateral breast lift with abdominoplasty    DIAGNOSTIC LAPAROSCOPY      LAPAROSCOPIC SLEEVE GASTRECTOMY      LIPOSUCTION  05/2022    LOOP ELECTROSURGICAL EXCISION PROCEDURE (LEEP)       REDUCTION OF BOTH BREASTS      SINUS SURGERY  2022    TONSILLECTOMY      TOTAL ABDOMINAL HYSTERECTOMY W/ BILATERAL SALPINGOOPHORECTOMY           GYN & OB History  No LMP recorded. Patient has had a hysterectomy.   Date of Last Pap: No result found    OB History    Para Term  AB Living   2 2 2     2   SAB IAB Ectopic Multiple Live Births                  # Outcome Date GA Lbr Onesimo/2nd Weight Sex Delivery Anes PTL Lv   2 Term      Vag-Spont      1 Term      Vag-Spont          Review of Systems  Review of Systems   Constitutional:  Negative for activity change, appetite change, chills, fatigue and fever.   HENT:  Negative for nasal congestion and tinnitus.    Eyes:  Negative for visual disturbance.   Respiratory:  Negative for cough and shortness of breath.    Cardiovascular:  Negative for chest pain and palpitations.   Gastrointestinal:  Negative for abdominal pain, bloating, blood in stool, constipation, nausea and vomiting.   Endocrine: Negative for diabetes, hair loss and hot flashes.   Genitourinary:  Negative for bladder incontinence, decreased libido, dysmenorrhea, dyspareunia, dysuria, flank pain, frequency, genital sores, hematuria, hot flashes, menorrhagia, menstrual problem, pelvic pain, urgency, vaginal bleeding, vaginal discharge, vaginal pain, urinary incontinence, postcoital bleeding, postmenopausal bleeding, vaginal dryness and vaginal odor.   Musculoskeletal:  Negative for arthralgias, back pain, leg pain and myalgias.   Integumentary:  Negative for rash, acne, hair changes, mole/lesion, breast mass, nipple discharge, breast skin changes and breast tenderness.   Neurological:  Negative for vertigo, syncope, numbness and headaches.   Hematological:  Does not bruise/bleed easily.   Psychiatric/Behavioral:  Negative for depression and sleep disturbance. The patient is not nervous/anxious.    Breast: Negative for asymmetry, lump, mass, mastodynia, nipple discharge, skin  changes and tenderness        Objective:    Physical Exam:   Constitutional: She appears well-developed and well-nourished.    HENT:   Nose: No epistaxis.     Neck: No thyroid mass and no thyromegaly present.     Pulmonary/Chest: Effort normal and breath sounds normal. Chest wall is not dull to percussion. She exhibits no mass, no tenderness, no bony tenderness, no laceration, no crepitus, no edema, no deformity, no swelling and no retraction. Right breast exhibits no inverted nipple, no mass, no nipple discharge, no skin change, no tenderness, presence, no bleeding and no swelling. Left breast exhibits no inverted nipple, no mass, no nipple discharge, no skin change, no tenderness, presence, no bleeding and no swelling. Breasts are symmetrical.        Abdominal: Soft. Bowel sounds are normal. She exhibits no distension. There is no abdominal tenderness. Hernia confirmed negative in the right inguinal area and confirmed negative in the left inguinal area.     Genitourinary:    Vagina and rectum normal.      Pelvic exam was performed with patient supine.   Labial bartholins normal.There is no rash, tenderness, lesion or injury on the right labia. There is no rash, tenderness, lesion or injury on the left labia. Right adnexum displays no mass, no tenderness and no fullness. Left adnexum displays no mass, no tenderness and no fullness. Vaginal cuff normal.  No erythema,  no vaginal discharge, tenderness, bleeding, rectocele, cystocele or unspecified prolapse of vaginal walls in the vagina.    No foreign body in the vagina.      No signs of injury in the vagina.   Cervix is absent.Uterus is absent.                Skin: Skin is warm and dry.    Psychiatric: She has a normal mood and affect. Her speech is normal and behavior is normal.        Assessment:        1. Gynecologic exam normal    2. Menopause syndrome    3. Encounter for screening mammogram for malignant neoplasm of breast    4. Screening for osteoporosis                 Plan:      Gynecologic exam normal    Menopause syndrome  -     Estradiol; Future; Expected date: 09/15/2022  -     Testosterone; Future; Expected date: 09/15/2022  -     Testosterone, Free; Future; Expected date: 09/15/2022    Encounter for screening mammogram for malignant neoplasm of breast  -     Mammo Digital Screening Bilat w/ Clarence; Future; Expected date: 09/15/2022    Screening for osteoporosis  -     DXA Bone Density Spine And Hip; Future; Expected date: 09/15/2022      Patient was counseled today on current ASCCP pap guidelines, the recommendation for yearly pelvic exams, healthy diet and exercise routines, annual mammograms and breast self awareness. She is to see her PCP for other health maintenance. Dexa due now    We will get labs to see how the injections are working and discuss other options     Follow up in about 1 year (around 9/15/2023).

## 2022-11-21 ENCOUNTER — PATIENT MESSAGE (OUTPATIENT)
Dept: PLASTIC SURGERY | Facility: CLINIC | Age: 52
End: 2022-11-21

## 2022-12-12 ENCOUNTER — OFFICE VISIT (OUTPATIENT)
Dept: PLASTIC SURGERY | Facility: CLINIC | Age: 52
End: 2022-12-12
Payer: COMMERCIAL

## 2022-12-12 VITALS
BODY MASS INDEX: 33.81 KG/M2 | OXYGEN SATURATION: 98 % | WEIGHT: 197 LBS | SYSTOLIC BLOOD PRESSURE: 108 MMHG | DIASTOLIC BLOOD PRESSURE: 80 MMHG | HEART RATE: 82 BPM

## 2022-12-12 DIAGNOSIS — Z98.890 STATUS POST COSMETIC PLASTIC SURGERY: Primary | ICD-10-CM

## 2022-12-12 PROCEDURE — 3079F PR MOST RECENT DIASTOLIC BLOOD PRESSURE 80-89 MM HG: ICD-10-PCS | Mod: CPTII,S$GLB,, | Performed by: SURGERY

## 2022-12-12 PROCEDURE — 1159F PR MEDICATION LIST DOCUMENTED IN MEDICAL RECORD: ICD-10-PCS | Mod: CPTII,S$GLB,, | Performed by: SURGERY

## 2022-12-12 PROCEDURE — 99024 PR POST-OP FOLLOW-UP VISIT: ICD-10-PCS | Mod: S$GLB,,, | Performed by: SURGERY

## 2022-12-12 PROCEDURE — 1159F MED LIST DOCD IN RCRD: CPT | Mod: CPTII,S$GLB,, | Performed by: SURGERY

## 2022-12-12 PROCEDURE — 3008F PR BODY MASS INDEX (BMI) DOCUMENTED: ICD-10-PCS | Mod: CPTII,S$GLB,, | Performed by: SURGERY

## 2022-12-12 PROCEDURE — 3079F DIAST BP 80-89 MM HG: CPT | Mod: CPTII,S$GLB,, | Performed by: SURGERY

## 2022-12-12 PROCEDURE — 3008F BODY MASS INDEX DOCD: CPT | Mod: CPTII,S$GLB,, | Performed by: SURGERY

## 2022-12-12 PROCEDURE — 3074F PR MOST RECENT SYSTOLIC BLOOD PRESSURE < 130 MM HG: ICD-10-PCS | Mod: CPTII,S$GLB,, | Performed by: SURGERY

## 2022-12-12 PROCEDURE — 99024 POSTOP FOLLOW-UP VISIT: CPT | Mod: S$GLB,,, | Performed by: SURGERY

## 2022-12-12 PROCEDURE — 3074F SYST BP LT 130 MM HG: CPT | Mod: CPTII,S$GLB,, | Performed by: SURGERY

## 2022-12-12 NOTE — PROGRESS NOTES
CC  Chief Complaint   Patient presents with    Follow-up     6 month follow up , procedure was 5/31/22.      Referring Provider- self  PCP: Clem Pierce MD    HPI  History from patient, chart, referring provider  Jessie Bush is a 52 y.o. female presenting for 6 month follow up after surgery.     Surgery with Date: 5/31/22 bilateral breast lift with lipoabdominoplasty  Incisions are healed.     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       PSH  Past Surgical History:   Procedure Laterality Date    AUGMENTATION OF BREAST  05/2022    COSMETIC SURGERY Bilateral 05/31/2022    bilateral breast lift with abdominoplasty    DIAGNOSTIC LAPAROSCOPY      LAPAROSCOPIC SLEEVE GASTRECTOMY      LIPOSUCTION  05/2022    LOOP ELECTROSURGICAL EXCISION PROCEDURE (LEEP)      REDUCTION OF BOTH BREASTS  2000    SINUS SURGERY  09/01/2022    TONSILLECTOMY      TOTAL ABDOMINAL HYSTERECTOMY W/ BILATERAL SALPINGOOPHORECTOMY  2000       FH  Family History   Problem Relation Age of Onset    No Known Problems Mother     No Known Problems Father     Colon cancer Maternal Grandfather     Breast cancer Neg Hx     Ovarian cancer Neg Hx     Uterine cancer Neg Hx     Melanoma Neg Hx     Pancreatic cancer Neg Hx     Prostate cancer Neg Hx        MEDICATIONS  Outpatient Medications Marked as Taking for the 12/12/22 encounter (Office Visit) with Ashleigh Bocanegra MD   Medication Sig Dispense Refill    DUPIXENT  mg/2 mL PnIj       escitalopram oxalate (LEXAPRO) 10 MG tablet       ferrous sulfate 324 mg (65 mg iron) TbEC Take by mouth 2 (two) times daily.      sod chlor,sod bicarb/neti pot (SINUS WASH NETI POT BHARATH) USE AS DIRECTED      spironolactone (ALDACTONE) 50 MG tablet Take 50 mg by mouth every morning.      testosterone cypionate (DEPOTESTOTERONE CYPIONATE) 200 mg/mL injection Inject 1 mL (200 mg total) into the muscle every 12 weeks.  "Can given injection every 8-12 weeks 1 mL 1       ALLERGIES Review of patient's allergies indicates:  No Known Allergies    SOCIAL HISTORY  Tobacco:   Social History     Tobacco Use   Smoking Status Never   Smokeless Tobacco Never     EtOH:   Social History     Substance and Sexual Activity   Alcohol Use Never         REVIEW OF SYSTEMS  Constitutional: Negative for chills and fever.   HENT: Negative for congestion.    Eyes: Negative for blurred vision and double vision.   Respiratory: Negative for cough and shortness of breath.    Cardiovascular: Negative for chest pain and palpitations.   Gastrointestinal: Negative for abdominal pain, nausea and vomiting.   Genitourinary: Negative for dysuria and frequency.   Musculoskeletal: Negative for back pain and neck pain.   Skin: Negative for itching and rash.   Neurological: Negative for dizziness, seizures and headaches.   Endo/Heme/Allergies: Does not bruise/bleed easily.   Psychiatric/Behavioral: Negative for depression and substance abuse.       PHYSICAL EXAM  /80   Pulse 82   SpO2 98%   Body mass index is 33.81 kg/m².  Estimated body surface area is 2.01 meters squared as calculated from the following:    Height as of 9/8/22: 5' 4" (1.626 m).    Weight as of this encounter: 89.4 kg (197 lb).      Constitutional: She is oriented to person, place, and time. She appears well-developed and well-nourished.   HENT: Normocephalic and atraumatic.   Neck: Normal range of motion. Neck supple. No JVD present.   Cardiovascular: Normal rate, regular rhythm and normal heart sounds.    Pulmonary/Chest: Effort normal. No respiratory distress.   Musculoskeletal: Normal range of motion. She exhibits no edema or deformity.   Neurological: She is alert and oriented to person, place, and time. No sensory deficit. She exhibits normal muscle tone.   Skin: Skin is warm. No rash noted. No erythema.   Psychiatric: She has a normal mood and affect. Her behavior is normal.      Breast " symmetry and shape good, soft no fat necrosis  Incisions healed  Scars pink, without hypertrophy  No masses  Nipples sensate  NAC healthy, viable  Abd wall thickness 2-3cm- noticeable suprapubic and lower abd    ASSESSMENT  6 mo post op  ?   PLAN   Discussed 2nd stage of liposuction   Mary will call with quote    ACTIVITY  No restrictions    MEDICATIONS  Continue your usual medications and vitamins per PCP.    SCAR MANAGEMENT  Scars may take over 1 year to mature.  Some scars will remain pink, dark purple, and possibly raised for 6-9 months after surgery.  After one year, scars often become flatter, smoother, and may change color.  Continue sun protection, scar gel and massage for this entire duration to optimize scar appearance.     Medical grade silicone gel is available on companies' web sites or on amazon.com  Brands recommended:  - Biocorneum  - Skin medica Scar Recovery Gel Skin Medica

## 2022-12-21 ENCOUNTER — PATIENT MESSAGE (OUTPATIENT)
Dept: OBSTETRICS AND GYNECOLOGY | Facility: CLINIC | Age: 52
End: 2022-12-21
Payer: COMMERCIAL

## 2022-12-22 RX ORDER — FLUCONAZOLE 150 MG/1
150 TABLET ORAL DAILY
Qty: 1 TABLET | Refills: 0 | Status: SHIPPED | OUTPATIENT
Start: 2022-12-22 | End: 2022-12-23

## 2022-12-29 LAB
E2: 100 PG/ML
FREE TESTOSTERONE: 0.07 NG/DL (ref 0–1)
TESTOST SERPL-MCNC: 6.6 NG/DL (ref 2.9–40.8)

## 2022-12-30 ENCOUNTER — PATIENT MESSAGE (OUTPATIENT)
Dept: OBSTETRICS AND GYNECOLOGY | Facility: CLINIC | Age: 52
End: 2022-12-30
Payer: COMMERCIAL

## 2023-01-04 ENCOUNTER — TELEPHONE (OUTPATIENT)
Dept: OBSTETRICS AND GYNECOLOGY | Facility: CLINIC | Age: 53
End: 2023-01-04
Payer: COMMERCIAL

## 2023-01-04 NOTE — TELEPHONE ENCOUNTER
1/3/23: (see results notes) Called and spoke with patient. She states her last injection was about 6-7 weeks before her labs. She feels better when she gave herself the injection at 8 weeks vs. 12 weeks. Is it okay to continue to give at 8 weeks?   Also wants to know if Iron Supplement can interfere with absorption of hormones?    Aydee is okay with patient to stay with doing the Testosterone injection every 8 weeks vs 12 weeks.     Today, I called to advise patient about continuing Testosterone every 8 weeks. Pt will call back when she is due for her next refill in 8 weeks.

## 2023-02-01 ENCOUNTER — PATIENT MESSAGE (OUTPATIENT)
Dept: PLASTIC SURGERY | Facility: CLINIC | Age: 53
End: 2023-02-01
Payer: COMMERCIAL

## 2023-02-01 DIAGNOSIS — Z41.1 ENCOUNTER FOR COSMETIC PROCEDURE: Primary | ICD-10-CM

## 2023-02-02 NOTE — PROGRESS NOTES
CC  Chief Complaint   Patient presents with    Pre-op Exam     Referring Provider- self  PCP: Clem Pierce MD    HPI  History from patient, chart, referring provider  Jessie Bush is a 52 y.o. female presenting for 6 month follow up after surgery.     Surgery with Date: 5/31/22 bilateral breast lift with lipoabdominoplasty  Incisions are healed.     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       PSH  Past Surgical History:   Procedure Laterality Date    AUGMENTATION OF BREAST  05/2022    COSMETIC SURGERY Bilateral 05/31/2022    bilateral breast lift with abdominoplasty    DIAGNOSTIC LAPAROSCOPY      LAPAROSCOPIC SLEEVE GASTRECTOMY      LIPOSUCTION  05/2022    LOOP ELECTROSURGICAL EXCISION PROCEDURE (LEEP)      REDUCTION OF BOTH BREASTS  2000    SINUS SURGERY  09/01/2022    TONSILLECTOMY      TOTAL ABDOMINAL HYSTERECTOMY W/ BILATERAL SALPINGOOPHORECTOMY  2000       FH  Family History   Problem Relation Age of Onset    No Known Problems Mother     No Known Problems Father     Colon cancer Maternal Grandfather     Breast cancer Neg Hx     Ovarian cancer Neg Hx     Uterine cancer Neg Hx     Melanoma Neg Hx     Pancreatic cancer Neg Hx     Prostate cancer Neg Hx        MEDICATIONS  Outpatient Medications Marked as Taking for the 2/6/23 encounter (Office Visit) with Ashleigh Bocanegra MD   Medication Sig Dispense Refill    clonazepam (KLONOPIN ORAL) Take by mouth.      escitalopram oxalate (LEXAPRO) 10 MG tablet       ferrous sulfate 324 mg (65 mg iron) TbEC Take by mouth 2 (two) times daily.      spironolactone (ALDACTONE) 50 MG tablet Take 50 mg by mouth every morning.      testosterone cypionate (DEPOTESTOTERONE CYPIONATE) 200 mg/mL injection Inject 1 mL (200 mg total) into the muscle every 12 weeks. Can given injection every 8-12 weeks 1 mL 1       ALLERGIES Review of patient's allergies indicates:  No Known  "Allergies    SOCIAL HISTORY  Tobacco:   Social History     Tobacco Use   Smoking Status Never   Smokeless Tobacco Never     EtOH:   Social History     Substance and Sexual Activity   Alcohol Use Never         REVIEW OF SYSTEMS  Constitutional: Negative for chills and fever.   HENT: Negative for congestion.    Eyes: Negative for blurred vision and double vision.   Respiratory: Negative for cough and shortness of breath.    Cardiovascular: Negative for chest pain and palpitations.   Gastrointestinal: Negative for abdominal pain, nausea and vomiting.   Genitourinary: Negative for dysuria and frequency.   Musculoskeletal: Negative for back pain and neck pain.   Skin: Negative for itching and rash.   Neurological: Negative for dizziness, seizures and headaches.   Endo/Heme/Allergies: Does not bruise/bleed easily.   Psychiatric/Behavioral: Negative for depression and substance abuse.       PHYSICAL EXAM  BP (!) 146/89   Pulse 89   Ht 5' 4" (1.626 m)   Wt 89.4 kg (197 lb)   SpO2 96%   BMI 33.81 kg/m²   Body mass index is 33.81 kg/m².  Estimated body surface area is 2.01 meters squared as calculated from the following:    Height as of this encounter: 5' 4" (1.626 m).    Weight as of this encounter: 89.4 kg (197 lb).      Constitutional: She is oriented to person, place, and time. She appears well-developed and well-nourished.   HENT: Normocephalic and atraumatic.   Neck: Normal range of motion. Neck supple. No JVD present.   Cardiovascular: Normal rate, regular rhythm and normal heart sounds.    Pulmonary/Chest: Effort normal. No respiratory distress.   Musculoskeletal: Normal range of motion. She exhibits no edema or deformity.   Neurological: She is alert and oriented to person, place, and time. No sensory deficit. She exhibits normal muscle tone.   Skin: Skin is warm. No rash noted. No erythema.   Psychiatric: She has a normal mood and affect. Her behavior is normal.      Breast symmetry and shape good, soft no fat " necrosis  Incisions healed  Scars pink, without hypertrophy  No masses  Nipples sensate  NAC healthy, viable  Abd wall thickness 2-3cm- noticeable suprapubic and lower abd    ASSESSMENT  6 mo post op  Past Medical History:   Diagnosis Date    Asthma     Endometriosis     Ovarian cyst        Past Surgical History:   Procedure Laterality Date    AUGMENTATION OF BREAST  05/2022    COSMETIC SURGERY Bilateral 05/31/2022    bilateral breast lift with abdominoplasty    DIAGNOSTIC LAPAROSCOPY      LAPAROSCOPIC SLEEVE GASTRECTOMY      LIPOSUCTION  05/2022    LOOP ELECTROSURGICAL EXCISION PROCEDURE (LEEP)      REDUCTION OF BOTH BREASTS  2000    SINUS SURGERY  09/01/2022    TONSILLECTOMY      TOTAL ABDOMINAL HYSTERECTOMY W/ BILATERAL SALPINGOOPHORECTOMY  2000       Family History   Problem Relation Age of Onset    No Known Problems Mother     No Known Problems Father     Colon cancer Maternal Grandfather     Breast cancer Neg Hx     Ovarian cancer Neg Hx     Uterine cancer Neg Hx     Melanoma Neg Hx     Pancreatic cancer Neg Hx     Prostate cancer Neg Hx        Social History     Socioeconomic History    Marital status:    Tobacco Use    Smoking status: Never    Smokeless tobacco: Never   Substance and Sexual Activity    Alcohol use: Never    Drug use: Never    Sexual activity: Yes     Partners: Male     Birth control/protection: See Surgical Hx     Comment: Hysterectomy         Review of patient's allergies indicates:  No Known Allergies   ?   PLAN  Second stage liposuction to abdomen and flanks and lower back with transfer to lower abdomen defect. Talking to her  about adding on lipo to her bilateral thighs and she will let the office know.     Pt presents today for preoperative exam for abdominal and flank liposuction scheduled for 2/15/23. Consents reviewed with patient. Pre and post operative instructions given to patient. Copies of prescriptions, orders, instructions and consents given to patient today.        Garment ordered  Photos obtained  Scripts provided  Consented for lipo to abd graft to contour abnormalities that remain  Loose skin with striae lower abdomen will need to conservative here

## 2023-02-06 ENCOUNTER — OFFICE VISIT (OUTPATIENT)
Dept: PLASTIC SURGERY | Facility: CLINIC | Age: 53
End: 2023-02-06

## 2023-02-06 VITALS
OXYGEN SATURATION: 96 % | SYSTOLIC BLOOD PRESSURE: 146 MMHG | HEIGHT: 64 IN | BODY MASS INDEX: 33.63 KG/M2 | DIASTOLIC BLOOD PRESSURE: 89 MMHG | WEIGHT: 197 LBS | HEART RATE: 89 BPM

## 2023-02-06 DIAGNOSIS — Z41.1 ENCOUNTER FOR COSMETIC PROCEDURE: Primary | ICD-10-CM

## 2023-02-06 LAB
ANION GAP SERPL CALC-SCNC: 9 MMOL/L (ref 3–11)
BANDS: 1 % (ref 0–5)
BUN SERPL-MCNC: 11 MG/DL (ref 7–18)
BUN/CREAT SERPL: 20 RATIO (ref 7–18)
CALCIUM SERPL-MCNC: 9 MG/DL (ref 8.8–10.5)
CELLS COUNTED: 100
CHLORIDE SERPL-SCNC: 104 MMOL/L (ref 100–108)
CO2 SERPL-SCNC: 26 MMOL/L (ref 21–32)
CREAT SERPL-MCNC: 0.55 MG/DL (ref 0.55–1.02)
EOSINOPHIL NFR BLD: 8 % (ref 1–3)
ERYTHROCYTE [DISTWIDTH] IN BLOOD BY AUTOMATED COUNT: 15.7 % (ref 12.5–18)
GFR ESTIMATION: > 60
GLUCOSE SERPL-MCNC: 89 MG/DL (ref 70–110)
HCT VFR BLD AUTO: 39.3 % (ref 37–47)
HGB BLD-MCNC: 13.1 G/DL (ref 12–16)
LYMPHOCYTES NFR BLD: 27 % (ref 25–40)
MCH RBC QN AUTO: 31 PG (ref 27–31.2)
MCHC RBC AUTO-ENTMCNC: 33.3 G/DL (ref 31.8–35.4)
MCV RBC AUTO: 93.1 FL (ref 80–97)
MONOCYTES NFR BLD: 6 % (ref 1–15)
NEUTROPHILS # BLD AUTO: 5.8 10*3/UL (ref 1.8–7.7)
NEUTROPHILS NFR BLD: 58 % (ref 37–80)
NUCLEATED RED BLOOD CELLS: 0 %
PLATELETS: 409 10*3/UL (ref 142–424)
POTASSIUM SERPL-SCNC: 4.3 MMOL/L (ref 3.6–5.2)
RBC # BLD AUTO: 4.22 10*6/UL (ref 4.2–5.4)
RBC MORPH BLD: ABNORMAL
SMALL PLATELETS BLD QL SMEAR: ADEQUATE
SODIUM BLD-SCNC: 139 MMOL/L (ref 135–145)
WBC # BLD: 9.8 10*3/UL (ref 4.6–10.2)

## 2023-02-06 PROCEDURE — 99499 NO LOS: ICD-10-PCS | Mod: S$GLB,,, | Performed by: SURGERY

## 2023-02-06 PROCEDURE — 99499 UNLISTED E&M SERVICE: CPT | Mod: S$GLB,,, | Performed by: SURGERY

## 2023-02-06 RX ORDER — OXYCODONE AND ACETAMINOPHEN 5; 325 MG/1; MG/1
TABLET ORAL
Qty: 20 TABLET | Refills: 0 | Status: SHIPPED | OUTPATIENT
Start: 2023-02-06 | End: 2023-09-21

## 2023-02-06 RX ORDER — CYCLOBENZAPRINE HCL 10 MG
10 TABLET ORAL 3 TIMES DAILY
Qty: 15 TABLET | Refills: 0 | Status: SHIPPED | OUTPATIENT
Start: 2023-02-06 | End: 2023-02-11

## 2023-02-06 RX ORDER — CEPHALEXIN 500 MG/1
500 CAPSULE ORAL EVERY 6 HOURS
Qty: 28 CAPSULE | Refills: 0 | Status: SHIPPED | OUTPATIENT
Start: 2023-02-06 | End: 2023-02-13

## 2023-02-06 RX ORDER — KETOROLAC TROMETHAMINE 10 MG/1
10 TABLET, FILM COATED ORAL EVERY 6 HOURS
Qty: 20 TABLET | Refills: 0 | Status: SHIPPED | OUTPATIENT
Start: 2023-02-06 | End: 2023-02-11

## 2023-02-13 ENCOUNTER — OFFICE VISIT (OUTPATIENT)
Dept: PLASTIC SURGERY | Facility: CLINIC | Age: 53
End: 2023-02-13

## 2023-02-13 ENCOUNTER — PATIENT MESSAGE (OUTPATIENT)
Dept: PLASTIC SURGERY | Facility: CLINIC | Age: 53
End: 2023-02-13

## 2023-02-13 VITALS — BODY MASS INDEX: 33.8 KG/M2 | HEIGHT: 64 IN | WEIGHT: 198 LBS

## 2023-02-13 DIAGNOSIS — Z41.1 ENCOUNTER FOR COSMETIC PROCEDURE: Primary | ICD-10-CM

## 2023-02-13 PROCEDURE — 99499 NO LOS: ICD-10-PCS | Mod: S$GLB,,, | Performed by: SURGERY

## 2023-02-13 PROCEDURE — 99499 UNLISTED E&M SERVICE: CPT | Mod: S$GLB,,, | Performed by: SURGERY

## 2023-02-14 NOTE — PROGRESS NOTES
Add on thigh lipo  Has not set up lymphatic massage yet  Consents updated, measured and set up with a garment

## 2023-02-15 ENCOUNTER — OUTSIDE PLACE OF SERVICE (OUTPATIENT)
Dept: PLASTIC SURGERY | Facility: CLINIC | Age: 53
End: 2023-02-15
Payer: COMMERCIAL

## 2023-02-15 LAB — COTININE, URINE: NORMAL

## 2023-02-15 PROCEDURE — 15877 PR SUCT ASSIS LIPECTOMY,TRUNK: ICD-10-PCS | Mod: CSM,,, | Performed by: SURGERY

## 2023-02-15 PROCEDURE — 15877 SUCTION LIPECTOMY TRUNK: CPT | Mod: CSM,,, | Performed by: SURGERY

## 2023-02-16 ENCOUNTER — PATIENT MESSAGE (OUTPATIENT)
Dept: PLASTIC SURGERY | Facility: CLINIC | Age: 53
End: 2023-02-16
Payer: COMMERCIAL

## 2023-02-20 ENCOUNTER — CLINICAL SUPPORT (OUTPATIENT)
Dept: PLASTIC SURGERY | Facility: CLINIC | Age: 53
End: 2023-02-20
Payer: COMMERCIAL

## 2023-02-20 VITALS — HEART RATE: 87 BPM | OXYGEN SATURATION: 98 % | SYSTOLIC BLOOD PRESSURE: 111 MMHG | DIASTOLIC BLOOD PRESSURE: 69 MMHG

## 2023-02-20 DIAGNOSIS — Z98.890 STATUS POST COSMETIC PLASTIC SURGERY: Primary | ICD-10-CM

## 2023-02-20 NOTE — PROGRESS NOTES
CC  Chief Complaint   Patient presents with    Follow-up     Post liposuction, pt has first appointment today for lymph massage      Referring Provider- self  PCP: Clem Pierce MD    HPI  History from patient, chart, referring provider  Jessie Bush is a 52 y.o. female presenting for 1 week  follow up after surgery.   Surgery with Date: 5/31/22 bilateral breast lift with lipoabdominoplasty  2/15/2023 liposuction to abdomen and thighs    Concerns today:  No concerns, there is some mild swelliing with creasing from garment.   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       PSH  Past Surgical History:   Procedure Laterality Date    AUGMENTATION OF BREAST  05/2022    COSMETIC SURGERY Bilateral 05/31/2022    bilateral breast lift with abdominoplasty    DIAGNOSTIC LAPAROSCOPY      LAPAROSCOPIC SLEEVE GASTRECTOMY      LIPOSUCTION  05/2022    LOOP ELECTROSURGICAL EXCISION PROCEDURE (LEEP)      REDUCTION OF BOTH BREASTS  2000    SINUS SURGERY  09/01/2022    TONSILLECTOMY      TOTAL ABDOMINAL HYSTERECTOMY W/ BILATERAL SALPINGOOPHORECTOMY  2000         Family History   Problem Relation Age of Onset    No Known Problems Mother     No Known Problems Father     Colon cancer Maternal Grandfather     Breast cancer Neg Hx     Ovarian cancer Neg Hx     Uterine cancer Neg Hx     Melanoma Neg Hx     Pancreatic cancer Neg Hx     Prostate cancer Neg Hx        MEDICATIONS  No outpatient medications have been marked as taking for the 2/20/23 encounter (Clinical Support) with DANA JULIO, [facility] PLASTIC SURGERY.       ALLERGIES Review of patient's allergies indicates:  No Known Allergies    SOCIAL HISTORY  Tobacco:   Social History     Tobacco Use   Smoking Status Never   Smokeless Tobacco Never     EtOH:   Social History     Substance and Sexual Activity   Alcohol Use Never         REVIEW OF SYSTEMS  Constitutional: Negative for chills and fever.   HENT:  "Negative for congestion.    Eyes: Negative for blurred vision and double vision.   Respiratory: Negative for cough and shortness of breath.    Cardiovascular: Negative for chest pain and palpitations.   Gastrointestinal: Negative for abdominal pain, nausea and vomiting.   Genitourinary: Negative for dysuria and frequency.   Musculoskeletal: Negative for back pain and neck pain.   Skin: Negative for itching and rash.   Neurological: Negative for dizziness, seizures and headaches.   Endo/Heme/Allergies: Does not bruise/bleed easily.   Psychiatric/Behavioral: Negative for depression and substance abuse.       PHYSICAL EXAM  /69   Pulse 87   SpO2 98%   There is no height or weight on file to calculate BMI.  Estimated body surface area is 2.01 meters squared as calculated from the following:    Height as of 2/13/23: 5' 4" (1.626 m).    Weight as of 2/13/23: 89.8 kg (198 lb).      Constitutional: She is oriented to person, place, and time. She appears well-developed and well-nourished.   HENT: Normocephalic and atraumatic.   Neck: Normal range of motion. Neck supple. No JVD present.   Cardiovascular: Normal rate, regular rhythm and normal heart sounds.    Pulmonary/Chest: Effort normal. No respiratory distress.   Musculoskeletal: Normal range of motion. She exhibits no edema or deformity.   Neurological: She is alert and oriented to person, place, and time. No sensory deficit. She exhibits normal muscle tone.   Skin: Skin is warm. No rash noted. No erythema.   Psychiatric: She has a normal mood and affect. Her behavior is normal.      Breast symmetry and shape good, soft no fat necrosis  Incisions healed  Scars pink, without hypertrophy  Mild bruising and swelling, creasing from garment noted. Foam placed over pt abdomen, instructed to wear this during the day     ASSESSMENT  1 week post liposuction to abdomen, flanks and thighs  ?   PLAN  Start lymphatic massaging     ACTIVITY  No " restrictions    MEDICATIONS  Continue your usual medications and vitamins per PCP.    SCAR MANAGEMENT  Scars may take over 1 year to mature.  Some scars will remain pink, dark purple, and possibly raised for 6-9 months after surgery.  After one year, scars often become flatter, smoother, and may change color.  Continue sun protection, scar gel and massage for this entire duration to optimize scar appearance.     Medical grade silicone gel is available on companies' web sites or on amazon.com  Brands recommended:  - Biocorneum  - Skin medica Scar Recovery Gel Skin Medica

## 2023-02-23 ENCOUNTER — TELEPHONE (OUTPATIENT)
Dept: PLASTIC SURGERY | Facility: CLINIC | Age: 53
End: 2023-02-23
Payer: COMMERCIAL

## 2023-02-23 NOTE — TELEPHONE ENCOUNTER
Lvm for pt to call back to get available apt times for post op apt on Monday. Pt currently scheduled for 2/27/23 @9:30

## 2023-02-26 ENCOUNTER — PATIENT MESSAGE (OUTPATIENT)
Dept: PLASTIC SURGERY | Facility: CLINIC | Age: 53
End: 2023-02-26
Payer: COMMERCIAL

## 2023-03-02 ENCOUNTER — PATIENT MESSAGE (OUTPATIENT)
Dept: OBSTETRICS AND GYNECOLOGY | Facility: CLINIC | Age: 53
End: 2023-03-02
Payer: COMMERCIAL

## 2023-03-02 ENCOUNTER — OFFICE VISIT (OUTPATIENT)
Dept: PLASTIC SURGERY | Facility: CLINIC | Age: 53
End: 2023-03-02

## 2023-03-02 VITALS
OXYGEN SATURATION: 98 % | DIASTOLIC BLOOD PRESSURE: 69 MMHG | WEIGHT: 203 LBS | HEIGHT: 64 IN | SYSTOLIC BLOOD PRESSURE: 100 MMHG | BODY MASS INDEX: 34.66 KG/M2 | HEART RATE: 84 BPM

## 2023-03-02 DIAGNOSIS — Z41.1 ENCOUNTER FOR COSMETIC SURGERY: Primary | ICD-10-CM

## 2023-03-02 DIAGNOSIS — R61 NIGHT SWEATS: ICD-10-CM

## 2023-03-02 PROCEDURE — 99024 PR POST-OP FOLLOW-UP VISIT: ICD-10-PCS | Mod: S$GLB,,, | Performed by: SURGERY

## 2023-03-02 PROCEDURE — 99024 POSTOP FOLLOW-UP VISIT: CPT | Mod: S$GLB,,, | Performed by: SURGERY

## 2023-03-02 RX ORDER — HYDROXYZINE HYDROCHLORIDE 25 MG/1
TABLET, FILM COATED ORAL
COMMUNITY
Start: 2023-03-01

## 2023-03-02 NOTE — PROGRESS NOTES
"POST-OPERATIVE EXAM    CC  Chief Complaint   Patient presents with    Follow-up     Abdominal & thigh lipo 2/15/23       PROCEDURE  Abdominal & thigh lipo 2/15/23    SUBJECTIVE  Denies fevers, drainage, or wound concerns.   Has not started lymphatic massage    PHYSICAL EXAM  /69 (BP Location: Left arm, Patient Position: Sitting, BP Method: Small (Automatic))   Pulse 84   Ht 5' 4" (1.626 m)   Wt 89.8 kg (198 lb)   SpO2 98%   BMI 33.99 kg/m²   Minor bruising   Healing primarily  Scars pink, without hypertrophy  No masses  Surgical site  Incisions clean dry and intact  No seroma or hematoma       ASSESSMENT     No signs of complications.  Patient is doing well after surgery.   Allow time for contour and scar maturation.  2-3 times a week massage scar balls  Aggressive deep massaging  Wear foam at night ok to leave out during the day    PLAN  Ok to shower  Ok to exercise no restrictions  Continue surgical garments  F/u in 1 month    "

## 2023-03-06 RX ORDER — ESTRADIOL VALERATE 40 MG/ML
40 INJECTION INTRAMUSCULAR
Qty: 1 ML | Refills: 6 | Status: SHIPPED | OUTPATIENT
Start: 2023-03-06 | End: 2023-09-21

## 2023-04-13 ENCOUNTER — PATIENT MESSAGE (OUTPATIENT)
Dept: PLASTIC SURGERY | Facility: CLINIC | Age: 53
End: 2023-04-13
Payer: COMMERCIAL

## 2023-09-21 ENCOUNTER — PATIENT MESSAGE (OUTPATIENT)
Dept: OBSTETRICS AND GYNECOLOGY | Facility: CLINIC | Age: 53
End: 2023-09-21

## 2023-09-21 ENCOUNTER — OFFICE VISIT (OUTPATIENT)
Dept: OBSTETRICS AND GYNECOLOGY | Facility: CLINIC | Age: 53
End: 2023-09-21
Payer: COMMERCIAL

## 2023-09-21 VITALS
DIASTOLIC BLOOD PRESSURE: 71 MMHG | WEIGHT: 202 LBS | HEART RATE: 74 BPM | SYSTOLIC BLOOD PRESSURE: 118 MMHG | BODY MASS INDEX: 34.49 KG/M2 | HEIGHT: 64 IN

## 2023-09-21 DIAGNOSIS — Z13.820 SCREENING FOR OSTEOPOROSIS: ICD-10-CM

## 2023-09-21 DIAGNOSIS — Z78.0 MENOPAUSE: ICD-10-CM

## 2023-09-21 DIAGNOSIS — Z12.31 ENCOUNTER FOR SCREENING MAMMOGRAM FOR MALIGNANT NEOPLASM OF BREAST: Primary | ICD-10-CM

## 2023-09-21 PROCEDURE — 3078F DIAST BP <80 MM HG: CPT | Mod: CPTII,S$GLB,, | Performed by: OBSTETRICS & GYNECOLOGY

## 2023-09-21 PROCEDURE — 3008F BODY MASS INDEX DOCD: CPT | Mod: CPTII,S$GLB,, | Performed by: OBSTETRICS & GYNECOLOGY

## 2023-09-21 PROCEDURE — 1159F MED LIST DOCD IN RCRD: CPT | Mod: CPTII,S$GLB,, | Performed by: OBSTETRICS & GYNECOLOGY

## 2023-09-21 PROCEDURE — 3074F SYST BP LT 130 MM HG: CPT | Mod: CPTII,S$GLB,, | Performed by: OBSTETRICS & GYNECOLOGY

## 2023-09-21 PROCEDURE — 3074F PR MOST RECENT SYSTOLIC BLOOD PRESSURE < 130 MM HG: ICD-10-PCS | Mod: CPTII,S$GLB,, | Performed by: OBSTETRICS & GYNECOLOGY

## 2023-09-21 PROCEDURE — 99396 PR PREVENTIVE VISIT,EST,40-64: ICD-10-PCS | Mod: S$GLB,,, | Performed by: OBSTETRICS & GYNECOLOGY

## 2023-09-21 PROCEDURE — 3008F PR BODY MASS INDEX (BMI) DOCUMENTED: ICD-10-PCS | Mod: CPTII,S$GLB,, | Performed by: OBSTETRICS & GYNECOLOGY

## 2023-09-21 PROCEDURE — 3078F PR MOST RECENT DIASTOLIC BLOOD PRESSURE < 80 MM HG: ICD-10-PCS | Mod: CPTII,S$GLB,, | Performed by: OBSTETRICS & GYNECOLOGY

## 2023-09-21 PROCEDURE — 99396 PREV VISIT EST AGE 40-64: CPT | Mod: S$GLB,,, | Performed by: OBSTETRICS & GYNECOLOGY

## 2023-09-21 PROCEDURE — 1159F PR MEDICATION LIST DOCUMENTED IN MEDICAL RECORD: ICD-10-PCS | Mod: CPTII,S$GLB,, | Performed by: OBSTETRICS & GYNECOLOGY

## 2023-09-21 RX ORDER — ESCITALOPRAM OXALATE 5 MG/1
5 TABLET ORAL
COMMUNITY
Start: 2023-07-31

## 2023-09-21 RX ORDER — CLONAZEPAM 0.5 MG/1
TABLET ORAL
COMMUNITY
Start: 2023-05-12

## 2023-09-21 RX ORDER — ESTERIFIED ESTROGEN AND METHYLTESTOSTERONE 1.25; 2.5 MG/1; MG/1
1 TABLET ORAL DAILY
Qty: 30 TABLET | Refills: 5 | Status: SHIPPED | OUTPATIENT
Start: 2023-09-21

## 2023-09-21 RX ORDER — MINOCYCLINE HYDROCHLORIDE 100 MG/1
100 CAPSULE ORAL EVERY MORNING
COMMUNITY
Start: 2023-09-05

## 2023-09-21 RX ORDER — BETAMETHASONE DIPROPIONATE 0.5 MG/G
CREAM TOPICAL
COMMUNITY
Start: 2023-06-10

## 2023-09-21 NOTE — PROGRESS NOTES
Subjective:      Patient ID: Jessei Bush is a 53 y.o. female.    Chief Complaint:  Well Woman      History of Present Illness  HPI  This is a 53 year old female coming in for her annual exam. She also has complaints of wanting to change to a pill for hrt     GYN & OB History  No LMP recorded. Patient has had a hysterectomy.   Date of Last Pap: No result found    OB History    Para Term  AB Living   2 2 2     2   SAB IAB Ectopic Multiple Live Births                  # Outcome Date GA Lbr Onesimo/2nd Weight Sex Delivery Anes PTL Lv   2 Term      Vag-Spont      1 Term      Vag-Spont          Review of Systems  Review of Systems   Constitutional:  Negative for fatigue, fever and unexpected weight change.   Respiratory:  Negative for cough and shortness of breath.    Cardiovascular:  Negative for chest pain, palpitations and leg swelling.   Gastrointestinal:  Negative for abdominal pain, bloating, blood in stool, constipation, diarrhea, nausea and vomiting.   Genitourinary:  Negative for decreased libido, dysmenorrhea, dyspareunia, dysuria, frequency, genital sores, hematuria, menorrhagia, menstrual problem, pelvic pain, urgency, vaginal discharge, urinary incontinence and vaginal odor.   Musculoskeletal:  Negative for arthralgias and joint swelling.   Integumentary:  Negative for rash, mole/lesion, breast mass, nipple discharge, breast skin changes and breast tenderness.   Psychiatric/Behavioral: Negative.     Breast: Negative for asymmetry, lump, mass, nipple discharge, skin changes and tenderness         Objective:     Physical Exam:   Constitutional: She appears well-developed and well-nourished.      Neck: No thyroid mass and no thyromegaly present.     Pulmonary/Chest: Chest wall is not dull to percussion. She exhibits no mass, no tenderness, no bony tenderness, no laceration, no crepitus, no edema, no deformity, no swelling and no retraction. Right breast exhibits no inverted nipple, no mass,  no nipple discharge, no skin change, no tenderness, presence, no bleeding and no swelling. Left breast exhibits no inverted nipple, no mass, no nipple discharge, no skin change, no tenderness, presence, no bleeding and no swelling. Breasts are symmetrical.        Abdominal: Soft. Bowel sounds are normal. She exhibits no distension. There is no abdominal tenderness. Hernia confirmed negative in the right inguinal area and confirmed negative in the left inguinal area.     Genitourinary:    Vagina and rectum normal.      Pelvic exam was performed with patient supine.   Labial bartholins normal.There is no rash, tenderness, lesion or injury on the right labia. There is no rash, tenderness, lesion or injury on the left labia. Right adnexum displays no mass, no tenderness and no fullness. Left adnexum displays no mass, no tenderness and no fullness. Vaginal cuff normal.  No erythema,  no vaginal discharge, tenderness, bleeding, rectocele, cystocele or unspecified prolapse of vaginal walls in the vagina.    No foreign body in the vagina.      No signs of injury in the vagina.   Cervix is absent.Uterus is absent.                Skin: Skin is warm and dry.    Psychiatric: She has a normal mood and affect. Her speech is normal and behavior is normal.       Chaperone present     Assessment:     1. Encounter for screening mammogram for malignant neoplasm of breast    2. Screening for osteoporosis    3. Menopause              Plan:     Encounter for screening mammogram for malignant neoplasm of breast  -     Mammo Digital Screening Bilat w/ Clarence; Future; Expected date: 09/21/2023    Screening for osteoporosis  -     DXA Bone Density Axial Skeleton 1 or more sites; Future; Expected date: 09/21/2023    Menopause  -     estrogens,esterified,-methyltestosterone 1.25-2.5mg (ESTRATEST) per tablet; Take 1 tablet by mouth once daily.  Dispense: 30 tablet; Refill: 5

## 2023-10-06 ENCOUNTER — PATIENT MESSAGE (OUTPATIENT)
Dept: OBSTETRICS AND GYNECOLOGY | Facility: CLINIC | Age: 53
End: 2023-10-06
Payer: COMMERCIAL

## 2023-10-09 ENCOUNTER — PATIENT MESSAGE (OUTPATIENT)
Dept: OBSTETRICS AND GYNECOLOGY | Facility: CLINIC | Age: 53
End: 2023-10-09
Payer: COMMERCIAL

## 2023-10-09 DIAGNOSIS — N64.89 OTHER SPECIFIED DISORDERS OF BREAST: Primary | ICD-10-CM

## 2023-10-24 DIAGNOSIS — N64.89 OTHER SPECIFIED DISORDERS OF BREAST: Primary | ICD-10-CM

## 2024-04-16 ENCOUNTER — TELEPHONE (OUTPATIENT)
Dept: OBSTETRICS AND GYNECOLOGY | Facility: CLINIC | Age: 54
End: 2024-04-16
Payer: COMMERCIAL

## 2024-04-17 DIAGNOSIS — Z78.0 MENOPAUSE: ICD-10-CM

## 2024-04-17 RX ORDER — ESTERIFIED ESTROGEN AND METHYLTESTOSTERONE 1.25; 2.5 MG/1; MG/1
1 TABLET ORAL DAILY
Qty: 30 TABLET | Refills: 5 | Status: SHIPPED | OUTPATIENT
Start: 2024-04-17 | End: 2024-06-06 | Stop reason: SDUPTHER

## 2024-04-17 NOTE — TELEPHONE ENCOUNTER
----- Message from Sheba Conley sent at 4/17/2024  9:10 AM CDT -----  Contact: Jessie  Type:  RX Refill Request    Who Called: Jessie  Refill or New Rx:Refill  RX Name and Strength:estrogens,esterified,-methyltestosterone 1.25-2.5mg (ESTRATEST) per tablet  How is the patient currently taking it? (ex. 1XDay):1XDay  Is this a 30 day or 90 day RX:30  Preferred Pharmacy with phone number:   [id number] - [address]  [address] [place] [id number]  Phone: [phone] Fax: [phone]     Local or Mail Order:local  Ordering Provider:  Would the patient rather a call back or a response via [facility]? Call back   Best Call Back Number:[phone]  Additional Information: patient is out

## 2024-04-18 DIAGNOSIS — Z78.0 MENOPAUSE: ICD-10-CM

## 2024-04-18 RX ORDER — ESTERIFIED ESTROGEN AND METHYLTESTOSTERONE 1.25; 2.5 MG/1; MG/1
1 TABLET ORAL DAILY
Qty: 30 TABLET | Refills: 5 | OUTPATIENT
Start: 2024-04-18

## 2024-06-06 DIAGNOSIS — Z78.0 MENOPAUSE: ICD-10-CM

## 2024-06-06 RX ORDER — ESTERIFIED ESTROGEN AND METHYLTESTOSTERONE 1.25; 2.5 MG/1; MG/1
1 TABLET ORAL DAILY
Qty: 30 TABLET | Refills: 5 | Status: SHIPPED | OUTPATIENT
Start: 2024-06-06

## 2024-07-25 ENCOUNTER — TELEPHONE (OUTPATIENT)
Dept: OBSTETRICS AND GYNECOLOGY | Facility: CLINIC | Age: 54
End: 2024-07-25
Payer: COMMERCIAL

## 2024-07-25 NOTE — TELEPHONE ENCOUNTER
Attempted to call pt back with no answer. LM on patient's voicemail advising her that Dr. Collazo is not accepting new patients but that Babs Perdue is if she would like to see her.

## 2024-07-25 NOTE — TELEPHONE ENCOUNTER
----- Message from Sheba Conley sent at 7/25/2024  9:14 AM CDT -----  Contact: Jessie Roman is calling to see if Dr. Collazo will take her on as  new patient being that she is current patient of Dr. Guerrero. Please call her at  [phone].    Thanks  Td

## 2024-07-29 ENCOUNTER — OFFICE VISIT (OUTPATIENT)
Dept: OBSTETRICS AND GYNECOLOGY | Facility: CLINIC | Age: 54
End: 2024-07-29
Payer: COMMERCIAL

## 2024-07-29 VITALS
BODY MASS INDEX: 29.37 KG/M2 | WEIGHT: 172 LBS | SYSTOLIC BLOOD PRESSURE: 109 MMHG | HEART RATE: 73 BPM | DIASTOLIC BLOOD PRESSURE: 64 MMHG | HEIGHT: 64 IN

## 2024-07-29 DIAGNOSIS — N89.8 VAGINAL LEUKORRHEA: Primary | ICD-10-CM

## 2024-07-29 PROCEDURE — 3074F SYST BP LT 130 MM HG: CPT | Mod: CPTII,S$GLB,, | Performed by: NURSE PRACTITIONER

## 2024-07-29 PROCEDURE — 1160F RVW MEDS BY RX/DR IN RCRD: CPT | Mod: CPTII,S$GLB,, | Performed by: NURSE PRACTITIONER

## 2024-07-29 PROCEDURE — 3078F DIAST BP <80 MM HG: CPT | Mod: CPTII,S$GLB,, | Performed by: NURSE PRACTITIONER

## 2024-07-29 PROCEDURE — 99213 OFFICE O/P EST LOW 20 MIN: CPT | Mod: S$GLB,,, | Performed by: NURSE PRACTITIONER

## 2024-07-29 PROCEDURE — 3008F BODY MASS INDEX DOCD: CPT | Mod: CPTII,S$GLB,, | Performed by: NURSE PRACTITIONER

## 2024-07-29 PROCEDURE — 1159F MED LIST DOCD IN RCRD: CPT | Mod: CPTII,S$GLB,, | Performed by: NURSE PRACTITIONER

## 2024-07-29 PROCEDURE — 99459 PELVIC EXAMINATION: CPT | Mod: S$GLB,,, | Performed by: NURSE PRACTITIONER

## 2024-07-29 RX ORDER — OMEPRAZOLE 40 MG/1
CAPSULE, DELAYED RELEASE ORAL
COMMUNITY
Start: 2024-05-16

## 2024-07-29 RX ORDER — BUDESONIDE AND FORMOTEROL FUMARATE DIHYDRATE 160; 4.5 UG/1; UG/1
2 AEROSOL RESPIRATORY (INHALATION) 2 TIMES DAILY
COMMUNITY
Start: 2024-02-07

## 2024-07-29 RX ORDER — TINIDAZOLE 500 MG/1
2 TABLET ORAL
Qty: 8 TABLET | Refills: 0 | Status: SHIPPED | OUTPATIENT
Start: 2024-07-29 | End: 2024-07-31

## 2024-07-29 NOTE — PROGRESS NOTES
Subjective     Patient ID: Jessie Bush is a 54 y.o. female.    Chief Complaint:  Vulvar Itch and Vaginitis      History of Present Illness  Gynecologic Exam  The patient's primary symptoms include genital itching, a genital odor and vaginal discharge (itching). This is a new problem. The current episode started in the past 7 days. The problem occurs daily. The problem has been unchanged. The patient is experiencing no pain. She is not pregnant. Associated symptoms include frequency. Pertinent negatives include no abdominal pain, anorexia, back pain, chills, constipation, diarrhea, discolored urine, dysuria, fever, flank pain, headaches, hematuria, nausea, painful intercourse, rash, urgency or vomiting. There has been no bleeding. She has not been passing clots. She has not been passing tissue. Nothing aggravates the symptoms. She has tried anti-fungal cream for the symptoms. The treatment provided no relief. She is sexually active. No, her partner does not have an STD. Her past medical history is significant for an abdominal surgery, a gynecological surgery and ovarian cysts.     Patient reports vaginal itching and irritation that started a few days ago.  She has noticed a clear to white discharge that has a fishy odor.  She attempted to take some Diflucan that she had from a previous encounter and got a little relief but it has not gone away all the way.  She does report some frequency    Outpatient Medications Marked as Taking for the 7/29/24 encounter (Office Visit) with Aydee Dougherty NP   Medication Sig Dispense Refill    budesonide-formoterol 160-4.5 mcg (SYMBICORT) 160-4.5 mcg/actuation HFAA Inhale 2 puffs into the lungs 2 (two) times daily.      clonazePAM (KLONOPIN) 0.5 MG tablet       DUPIXENT  mg/2 mL PnIj       EScitalopram oxalate (LEXAPRO) 5 MG Tab Take 5 mg by mouth.      estrogens,esterified,-methyltestosterone 1.25-2.5mg (ESTRATEST) per tablet Take 1 tablet by mouth once daily.  "30 tablet 5    ferrous sulfate 324 mg (65 mg iron) TbEC Take by mouth 2 (two) times daily.      omeprazole (PRILOSEC) 40 MG capsule        Vitals:    24 1408   BP: 109/64   Pulse: 73   Weight: 78 kg (172 lb)   Height: 5' 4" (1.626 m)     Past Medical History:   Diagnosis Date    Asthma     Endometriosis     Low iron     Nasal polyp, unspecified     Ovarian cyst      Past Surgical History:   Procedure Laterality Date    AUGMENTATION OF BREAST  2022    COSMETIC SURGERY Bilateral 2022    bilateral breast lift with abdominoplasty    DIAGNOSTIC LAPAROSCOPY      LAPAROSCOPIC SLEEVE GASTRECTOMY      LIPOSUCTION  2022    LIPOSUCTION Bilateral 02/15/2023    abdomen and thighs    LOOP ELECTROSURGICAL EXCISION PROCEDURE (LEEP)      REDUCTION OF BOTH BREASTS      SINUS SURGERY  2022    TONSILLECTOMY      TOTAL ABDOMINAL HYSTERECTOMY W/ BILATERAL SALPINGOOPHORECTOMY         GYN & OB History  No LMP recorded. Patient has had a hysterectomy.   Date of Last Pap: No result found    OB History    Para Term  AB Living   2 2 2     2   SAB IAB Ectopic Multiple Live Births                  # Outcome Date GA Lbr Onesimo/2nd Weight Sex Type Anes PTL Lv   2 Term      Vag-Spont      1 Term      Vag-Spont          Review of Systems  Review of Systems   Constitutional:  Negative for chills and fever.   Gastrointestinal:  Negative for abdominal pain, anorexia, constipation, diarrhea, nausea and vomiting.   Genitourinary:  Positive for frequency, vaginal discharge (itching) and vaginal odor. Negative for dysuria, flank pain, hematuria and urgency.   Musculoskeletal:  Negative for back pain.   Integumentary:  Negative for rash.   Neurological:  Negative for headaches.          Objective   Physical Exam:               Genitourinary:    Rectum normal.      Pelvic exam was performed with patient supine.   The external female genitalia was normal.     Labial bartholins normal.There is vaginal discharge " (copious, white/yellow) in the vagina.                     Female chaperone present for exam       Assessment and Plan     1. Vaginal leukorrhea             Plan:  Vaginal leukorrhea  -     Aerobic culture; Future; Expected date: 07/29/2024  -     Vaginosis Screen by DNA Probe; Future; Expected date: 07/29/2024  -     tinidazole (TINDAMAX) 500 MG tablet; Take 4 tablets (2 g total) by mouth daily with breakfast. for 2 days  Dispense: 8 tablet; Refill: 0      It is presumed that you vaginitis. This can cause itching, odor, burning and discharge. I will call you with your swab results. In the mean time please start a probiotic. The probiotic will help to balance vaginal PH decreasing vaginal inflammation and boost vaginal health. Do not douche. Refrain from tub bathing at this time. The following medication has been sent to your pharmacy:  Tinidazole, no alcohol, start BAS    Follow up if symptoms worsen or fail to improve.

## 2024-10-09 ENCOUNTER — OFFICE VISIT (OUTPATIENT)
Dept: OBSTETRICS AND GYNECOLOGY | Facility: CLINIC | Age: 54
End: 2024-10-09
Payer: COMMERCIAL

## 2024-10-09 VITALS
WEIGHT: 177 LBS | BODY MASS INDEX: 30.38 KG/M2 | DIASTOLIC BLOOD PRESSURE: 66 MMHG | HEART RATE: 78 BPM | SYSTOLIC BLOOD PRESSURE: 95 MMHG

## 2024-10-09 DIAGNOSIS — Z12.31 SCREENING MAMMOGRAM FOR BREAST CANCER: ICD-10-CM

## 2024-10-09 DIAGNOSIS — Z78.0 MENOPAUSE: ICD-10-CM

## 2024-10-09 DIAGNOSIS — Z01.419 WELL WOMAN EXAM WITH ROUTINE GYNECOLOGICAL EXAM: Primary | ICD-10-CM

## 2024-10-09 PROCEDURE — 1159F MED LIST DOCD IN RCRD: CPT | Mod: CPTII,,,

## 2024-10-09 PROCEDURE — 1160F RVW MEDS BY RX/DR IN RCRD: CPT | Mod: CPTII,,,

## 2024-10-09 PROCEDURE — 3074F SYST BP LT 130 MM HG: CPT | Mod: CPTII,,,

## 2024-10-09 PROCEDURE — 3078F DIAST BP <80 MM HG: CPT | Mod: CPTII,,,

## 2024-10-09 PROCEDURE — 99396 PREV VISIT EST AGE 40-64: CPT | Mod: S$PBB,,,

## 2024-10-09 PROCEDURE — 3008F BODY MASS INDEX DOCD: CPT | Mod: CPTII,,,

## 2024-10-09 RX ORDER — ESTRADIOL 1 MG/1
1 TABLET ORAL DAILY
Qty: 30 TABLET | Refills: 11 | Status: SHIPPED | OUTPATIENT
Start: 2024-10-09 | End: 2025-10-09

## 2024-10-09 NOTE — PROGRESS NOTES
Subjective:      Patient ID: Jessie Bush is a 54 y.o. female who presents for evaluation today.    Chief Complaint:    Well Woman (Pt has no c/o)      History of Present Illness  HPI  Annual Exam (Postmenopausal) - Patient presents for annual exam. The patient has also has complaints of acne. The patient is sexually active. GYN screening history: last pap: was normal and last mammogram: was normal. The patient is taking hormone replacement therapy. Patient denies post-menopausal vaginal bleeding. The patient wears seatbelts: yes. The patient participates in regular exercise: yes. Has the patient ever been transfused or tattooed?: not asked. The patient reports that there is not domestic violence in her life. She has been on estra-test for approximately 1 year and notes worsening acne. Hot flashes are well controlled. Was using testosterone for fatigue & libido. She denies GI or  problems.     GYN History  No LMP recorded. Patient has had a hysterectomy.   Date of Last Pap: Up to date in accordance with ASCCP guidelines Pap smear schedule reviewed with patient Result history reviewed with patient Pap smear not performed    VITALS  BP 95/66 (BP Location: Right forearm, Patient Position: Sitting)   Pulse 78   Wt 80.3 kg (177 lb)   BMI 30.38 kg/m²   Weight: 80.3 kg (177 lb)         PAST MEDICAL HISTORY  Past Medical History:   Diagnosis Date    Asthma     Endometriosis     Low iron     Nasal polyp, unspecified     Ovarian cyst        PAST SURGICAL HISTORY  Past Surgical History:   Procedure Laterality Date    AUGMENTATION OF BREAST  05/2022    COSMETIC SURGERY Bilateral 05/31/2022    bilateral breast lift with abdominoplasty    DIAGNOSTIC LAPAROSCOPY      LAPAROSCOPIC SLEEVE GASTRECTOMY      LIPOSUCTION  05/2022    LIPOSUCTION Bilateral 02/15/2023    abdomen and thighs    LOOP ELECTROSURGICAL EXCISION PROCEDURE (LEEP)      REDUCTION OF BOTH BREASTS  2000    SINUS SURGERY  09/01/2022    TONSILLECTOMY       TOTAL ABDOMINAL HYSTERECTOMY W/ BILATERAL SALPINGOOPHORECTOMY  2000       SOCIAL HISTORY  Social History     Tobacco Use   Smoking Status Never   Smokeless Tobacco Never   ,   Social History     Substance and Sexual Activity   Alcohol Use Never        MEDICATIONS  Outpatient Medications Marked as Taking for the 10/9/24 encounter (Office Visit) with Babs Pedrue NP   Medication Sig Dispense Refill    DUPIXENT  mg/2 mL PnIj       EScitalopram oxalate (LEXAPRO) 5 MG Tab Take 5 mg by mouth.      ferrous sulfate 324 mg (65 mg iron) TbEC Take by mouth 2 (two) times daily.      [DISCONTINUED] estrogens,esterified,-methyltestosterone 1.25-2.5mg (ESTRATEST) per tablet Take 1 tablet by mouth once daily. 30 tablet 5         Review of Systems   Review of Systems   Constitutional:  Negative for activity change.   Eyes:  Negative for visual disturbance.   Respiratory:  Negative for shortness of breath.    Cardiovascular:  Negative for chest pain.   Gastrointestinal:  Negative for abdominal pain.   Genitourinary:  Negative for vaginal bleeding.        No abnormal vaginal bleeding   Musculoskeletal:  Negative for back pain.   Integumentary:  Positive for acne. Negative for rash and breast mass.   Neurological:  Negative for numbness.   Psychiatric/Behavioral:          No mood disturbance or changes    Breast: Negative for mass          Objective:     Physical Exam:   Constitutional: She is oriented to person, place, and time. She appears well-developed.    HENT:   Head: Normocephalic.     Neck: Trachea normal. No thyromegaly present.    Cardiovascular:  Normal rate, regular rhythm and normal heart sounds.             Pulmonary/Chest: Effort normal and breath sounds normal. Right breast exhibits no mass, no nipple discharge and no skin change. Left breast exhibits no mass, no nipple discharge and no skin change.   Mild fibrocystic changes    During the exam self breast exam discussed         Abdominal: Soft. There is  no abdominal tenderness. There is no rigidity and no guarding.     Genitourinary:    Vagina normal.      Pelvic exam was performed with patient supine.   The external female genitalia was normal.     Labial bartholins normal.There is no lesion on the right labia. There is no lesion on the left labia. Right adnexum displays no mass and no tenderness. Left adnexum displays no mass and no tenderness. Cervix is absent.Uterus is absent.              Lymphadenopathy:        Head (right side): No submental and no submandibular adenopathy present.        Head (left side): No submental and no submandibular adenopathy present.     She has no cervical adenopathy.    Neurological: She is alert and oriented to person, place, and time.    Skin: Skin is warm.    Psychiatric: She has a normal mood and affect. Her speech is normal and behavior is normal. Thought content normal.          Assessment:     Well woman exam with routine gynecological exam  -     Liquid-based pap smear, screening    Screening mammogram for breast cancer  -     Mammo Digital Screening Bilat; Future; Expected date: 10/09/2024    Menopause  -     estradioL (ESTRACE) 1 MG tablet; Take 1 tablet (1 mg total) by mouth once daily.  Dispense: 30 tablet; Refill: 11         Plan:     Patient instructed to contact the clinic should any questions or concerns arise prior to her next office visit. Patient is happy with the plan of care at this time, verbalizes understanding and denies outstanding questions.      Pap  Mammogram  Self-breast exams  She would like to try estradiol w/o testosterone to see if acne improves  Discussed trial of addyi or testosterone cream if needed  She will reach out if she desires to try  Consider annual health panel with Primary Care if not done  Colonoscopy as indicated  Bone density discussed  If you don't hear from the office regarding results within 1 week, please call  Follow up in 1 year for annual or sooner as needed  Chaperone  present for exam   Female chaperone present.

## 2024-10-11 LAB — Lab: NORMAL
